# Patient Record
Sex: FEMALE | Race: WHITE | HISPANIC OR LATINO | Employment: UNEMPLOYED
[De-identification: names, ages, dates, MRNs, and addresses within clinical notes are randomized per-mention and may not be internally consistent; named-entity substitution may affect disease eponyms.]

---

## 2018-03-20 ENCOUNTER — HOSPITAL (OUTPATIENT)
Dept: OTHER | Age: 33
End: 2018-03-20
Attending: EMERGENCY MEDICINE

## 2018-03-20 LAB
ALBUMIN SERPL-MCNC: 4 GM/DL (ref 3.6–5.1)
ALBUMIN/GLOB SERPL: 1 {RATIO} (ref 1–2.4)
ALP SERPL-CCNC: 66 UNIT/L (ref 45–117)
ALT SERPL-CCNC: 47 UNIT/L
ANALYZER ANC (IANC): ABNORMAL
ANION GAP SERPL CALC-SCNC: 18 MMOL/L (ref 10–20)
AST SERPL-CCNC: 22 UNIT/L
BASOPHILS # BLD: 0.1 THOUSAND/MCL (ref 0–0.3)
BASOPHILS NFR BLD: 0 %
BILIRUB SERPL-MCNC: 0.3 MG/DL (ref 0.2–1)
BUN SERPL-MCNC: 9 MG/DL (ref 6–20)
BUN/CREAT SERPL: 19 (ref 7–25)
CALCIUM SERPL-MCNC: 9.3 MG/DL (ref 8.4–10.2)
CHLORIDE: 106 MMOL/L (ref 98–107)
CO2 SERPL-SCNC: 23 MMOL/L (ref 21–32)
CREAT SERPL-MCNC: 0.48 MG/DL (ref 0.51–0.95)
DIFFERENTIAL METHOD BLD: ABNORMAL
EOSINOPHIL # BLD: 0.2 THOUSAND/MCL (ref 0.1–0.5)
EOSINOPHIL NFR BLD: 1 %
ERYTHROCYTE [DISTWIDTH] IN BLOOD: 13 % (ref 11–15)
GLOBULIN SER-MCNC: 4.2 GM/DL (ref 2–4)
GLUCOSE SERPL-MCNC: 82 MG/DL (ref 65–99)
HEMATOCRIT: 43.1 % (ref 36–46.5)
HGB BLD-MCNC: 14.6 GM/DL (ref 12–15.5)
LYMPHOCYTES # BLD: 3.5 THOUSAND/MCL (ref 1–4.8)
LYMPHOCYTES NFR BLD: 26 %
MCH RBC QN AUTO: 28.7 PG (ref 26–34)
MCHC RBC AUTO-ENTMCNC: 33.9 GM/DL (ref 32–36.5)
MCV RBC AUTO: 84.7 FL (ref 78–100)
MONOCYTES # BLD: 0.9 THOUSAND/MCL (ref 0.3–0.9)
MONOCYTES NFR BLD: 6 %
NEUTROPHILS # BLD: 8.8 THOUSAND/MCL (ref 1.8–7.7)
NEUTROPHILS NFR BLD: 67 %
NEUTS SEG NFR BLD: ABNORMAL %
PERCENT NRBC: ABNORMAL
PLATELET # BLD: 425 THOUSAND/MCL (ref 140–450)
POTASSIUM SERPL-SCNC: 3.8 MMOL/L (ref 3.4–5.1)
PROT SERPL-MCNC: 8.2 GM/DL (ref 6.4–8.2)
RBC # BLD: 5.09 MILLION/MCL (ref 4–5.2)
SODIUM SERPL-SCNC: 143 MMOL/L (ref 135–145)
TROPONIN I SERPL HS-MCNC: <0.02 NG/ML
WBC # BLD: 13.3 THOUSAND/MCL (ref 4.2–11)

## 2021-06-02 ENCOUNTER — OFFICE VISIT (OUTPATIENT)
Dept: SURGERY | Facility: CLINIC | Age: 36
End: 2021-06-02
Payer: COMMERCIAL

## 2021-06-02 VITALS
HEIGHT: 61.2 IN | HEART RATE: 98 BPM | SYSTOLIC BLOOD PRESSURE: 128 MMHG | DIASTOLIC BLOOD PRESSURE: 81 MMHG | BODY MASS INDEX: 43.23 KG/M2 | OXYGEN SATURATION: 99 % | WEIGHT: 229 LBS

## 2021-06-02 DIAGNOSIS — R63.5 WEIGHT GAIN: ICD-10-CM

## 2021-06-02 DIAGNOSIS — E55.9 VITAMIN D DEFICIENCY: ICD-10-CM

## 2021-06-02 DIAGNOSIS — E88.81 INSULIN RESISTANCE: Primary | ICD-10-CM

## 2021-06-02 DIAGNOSIS — E66.01 MORBID OBESITY WITH BMI OF 40.0-44.9, ADULT (HCC): ICD-10-CM

## 2021-06-02 DIAGNOSIS — R73.09 ABNORMAL BLOOD SUGAR: ICD-10-CM

## 2021-06-02 PROBLEM — E88.819 INSULIN RESISTANCE: Status: ACTIVE | Noted: 2021-06-02

## 2021-06-02 PROCEDURE — 99204 OFFICE O/P NEW MOD 45 MIN: CPT | Performed by: INTERNAL MEDICINE

## 2021-06-02 PROCEDURE — 3074F SYST BP LT 130 MM HG: CPT | Performed by: INTERNAL MEDICINE

## 2021-06-02 PROCEDURE — 3079F DIAST BP 80-89 MM HG: CPT | Performed by: INTERNAL MEDICINE

## 2021-06-02 PROCEDURE — 3008F BODY MASS INDEX DOCD: CPT | Performed by: INTERNAL MEDICINE

## 2021-06-02 RX ORDER — PHENTERMINE HYDROCHLORIDE 15 MG/1
15 CAPSULE ORAL EVERY MORNING
Qty: 30 CAPSULE | Refills: 2 | Status: SHIPPED | OUTPATIENT
Start: 2021-06-02 | End: 2021-08-05

## 2021-06-02 NOTE — PROGRESS NOTES
[facility] Consultation Note       Patient:  Radha Powell  :      1985  MRN:      [id number]    Referring Provider: Dr. Izquierdo ref.  provider found       Chief Complaint:  Patient presents with:  Weight Management: non-surgical weight   Difficulty of Paying Living Expenses:   Food Insecurity:       Worried About [address] in the Last Year:       Ran Out of Food in the Last Year:   Transportation Needs:       Lack of Transportation (Medical):       Lack of Transportation (Non-M chest pain  Gastrointestinal: positive for reflux symptoms  Musculoskeletal:positive for arthralgias and back pain  Neurological: negative  Behavioral/Psych: positive for stress  Endocrine: negative  All other systems were reviewed and are negative.     Mindy exercise program. The patient will check her heart rate and blood pressure on a regular basis. She will call me if her BP goes over 140/90 or if she has palpitations or racing heart rate.  She understands that I will not call in the prescription for her; sh

## 2021-06-26 ENCOUNTER — LAB ENCOUNTER (OUTPATIENT)
Dept: LAB | Facility: HOSPITAL | Age: 36
End: 2021-06-26
Attending: INTERNAL MEDICINE
Payer: COMMERCIAL

## 2021-06-26 DIAGNOSIS — E66.01 MORBID OBESITY WITH BMI OF 40.0-44.9, ADULT (HCC): ICD-10-CM

## 2021-06-26 DIAGNOSIS — E55.9 VITAMIN D DEFICIENCY: ICD-10-CM

## 2021-06-26 DIAGNOSIS — R63.5 WEIGHT GAIN: ICD-10-CM

## 2021-06-26 DIAGNOSIS — E88.81 INSULIN RESISTANCE: ICD-10-CM

## 2021-06-26 DIAGNOSIS — R73.09 ABNORMAL BLOOD SUGAR: ICD-10-CM

## 2021-06-26 PROCEDURE — 93010 ELECTROCARDIOGRAM REPORT: CPT | Performed by: INTERNAL MEDICINE

## 2021-06-26 PROCEDURE — 93005 ELECTROCARDIOGRAM TRACING: CPT

## 2021-06-26 PROCEDURE — 36415 COLL VENOUS BLD VENIPUNCTURE: CPT

## 2021-06-26 PROCEDURE — 85025 COMPLETE CBC W/AUTO DIFF WBC: CPT

## 2021-06-26 PROCEDURE — 84443 ASSAY THYROID STIM HORMONE: CPT

## 2021-06-26 PROCEDURE — 82397 CHEMILUMINESCENT ASSAY: CPT

## 2021-06-26 PROCEDURE — 80061 LIPID PANEL: CPT

## 2021-06-26 PROCEDURE — 82607 VITAMIN B-12: CPT

## 2021-06-26 PROCEDURE — 80053 COMPREHEN METABOLIC PANEL: CPT

## 2021-06-26 PROCEDURE — 83036 HEMOGLOBIN GLYCOSYLATED A1C: CPT

## 2021-07-01 LAB — LEPTIN: 37.3 NG/ML

## 2021-07-28 ENCOUNTER — OFFICE VISIT (OUTPATIENT)
Dept: SURGERY | Facility: CLINIC | Age: 36
End: 2021-07-28
Payer: COMMERCIAL

## 2021-07-28 VITALS — BODY MASS INDEX: 43 KG/M2 | WEIGHT: 227.5 LBS

## 2021-07-28 DIAGNOSIS — R73.09 ABNORMAL BLOOD SUGAR: ICD-10-CM

## 2021-07-28 DIAGNOSIS — E66.01 MORBID OBESITY WITH BMI OF 40.0-44.9, ADULT (HCC): ICD-10-CM

## 2021-07-28 DIAGNOSIS — E88.81 INSULIN RESISTANCE: ICD-10-CM

## 2021-07-28 DIAGNOSIS — R63.5 WEIGHT GAIN: Primary | ICD-10-CM

## 2021-07-28 DIAGNOSIS — E66.01 MORBID OBESITY WITH BMI OF 40.0-44.9, ADULT (HCC): Primary | ICD-10-CM

## 2021-07-28 DIAGNOSIS — E55.9 VITAMIN D DEFICIENCY: ICD-10-CM

## 2021-07-28 PROCEDURE — 97802 MEDICAL NUTRITION INDIV IN: CPT

## 2021-07-28 NOTE — PROGRESS NOTES
[address] [place] [address] AND WEIGHT LOSS CLINIC  [address] [place] [address]  Dept: [phone]  Loc: [phone]    07/28/21      Bariatric Initial Nutrition Session    Ar Nixon is a 39year old female. for: VITB1  No results found for: FOLIC     Meds:     Current Outpatient Medications:   •  Phentermine HCl 15 MG Oral Cap, Take 1 capsule (15 mg total) by mouth every morning., Disp: 30 capsule, Rfl: 2    Height:  Ht Readings from Last 1 Encounters:  06/02 or try a protein supplement. Pt disappointed by only 2 lb weight loss, provided encouragement and motivation. Nutrition Diagnosis     Nutrition Diagnosis: Morbid obesity: Unresolved     Intervention     Recommendations/goals:    1.  Log food intake usin

## 2021-08-05 ENCOUNTER — OFFICE VISIT (OUTPATIENT)
Dept: SURGERY | Facility: CLINIC | Age: 36
End: 2021-08-05
Payer: COMMERCIAL

## 2021-08-05 VITALS
DIASTOLIC BLOOD PRESSURE: 66 MMHG | BODY MASS INDEX: 42.71 KG/M2 | SYSTOLIC BLOOD PRESSURE: 110 MMHG | HEART RATE: 85 BPM | OXYGEN SATURATION: 99 % | WEIGHT: 226.19 LBS | HEIGHT: 61.2 IN

## 2021-08-05 DIAGNOSIS — R63.5 WEIGHT GAIN: ICD-10-CM

## 2021-08-05 DIAGNOSIS — E88.81 INSULIN RESISTANCE: ICD-10-CM

## 2021-08-05 DIAGNOSIS — E66.01 MORBID OBESITY WITH BMI OF 40.0-44.9, ADULT (HCC): ICD-10-CM

## 2021-08-05 DIAGNOSIS — Z51.81 ENCOUNTER FOR THERAPEUTIC DRUG MONITORING: Primary | ICD-10-CM

## 2021-08-05 DIAGNOSIS — E55.9 VITAMIN D DEFICIENCY: ICD-10-CM

## 2021-08-05 PROCEDURE — 3008F BODY MASS INDEX DOCD: CPT | Performed by: NURSE PRACTITIONER

## 2021-08-05 PROCEDURE — 99214 OFFICE O/P EST MOD 30 MIN: CPT | Performed by: NURSE PRACTITIONER

## 2021-08-05 PROCEDURE — 3078F DIAST BP <80 MM HG: CPT | Performed by: NURSE PRACTITIONER

## 2021-08-05 PROCEDURE — 3074F SYST BP LT 130 MM HG: CPT | Performed by: NURSE PRACTITIONER

## 2021-08-05 RX ORDER — PHENTERMINE HYDROCHLORIDE 15 MG/1
15 CAPSULE ORAL EVERY MORNING
Qty: 30 CAPSULE | Refills: 2 | Status: SHIPPED | OUTPATIENT
Start: 2021-08-05 | End: 2021-09-30

## 2021-08-05 NOTE — PROGRESS NOTES
[address] [facility]  [address] [facility] [address]  Dept: [phone]       Patient:  Eleni Howell  :      1985  MRN:      [id number]    Chief Complaint:  Patient Allergies:  Patient has no known allergies.      Social History:  Social History    Socioeconomic History      Marital status:       Spouse name: Not on file      Number of children: Not on file      Years of education: Not on file      Highest e of the week    Eating Habits  · Patient states the following:  · Eats 3 meal(s) per day  · Length of time it takes to consume a meal:    · # of snacks per day:  Type of snacks:  fruit  · Amount of soda consumption per day:  None   · Amount of water (in [employer] resistance  Weight gain  Encounter for therapeutic drug monitoring  (primary encounter diagnosis)    PLAN         Diagnoses and all orders for this visit:    Encounter for therapeutic drug monitoring    Morbid obesity with BMI of 40.0-44.9, adult ([facility] [address]  - RD.    Meet with surgeon as planned.      Labs done 6/26/2021.     Has 's support.      May benefit from pannus removal.     Brandi Freeman, APRN

## 2021-08-11 ENCOUNTER — DOCUMENTATION ONLY (OUTPATIENT)
Dept: SURGERY | Facility: CLINIC | Age: 36
End: 2021-08-11

## 2021-08-11 ENCOUNTER — OFFICE VISIT (OUTPATIENT)
Dept: SURGERY | Facility: CLINIC | Age: 36
End: 2021-08-11
Payer: COMMERCIAL

## 2021-08-11 VITALS — BODY MASS INDEX: 41.59 KG/M2 | OXYGEN SATURATION: 98 % | WEIGHT: 226 LBS | HEART RATE: 75 BPM | HEIGHT: 61.9 IN

## 2021-08-11 DIAGNOSIS — E55.9 VITAMIN D DEFICIENCY: Primary | ICD-10-CM

## 2021-08-11 DIAGNOSIS — R63.5 WEIGHT GAIN: ICD-10-CM

## 2021-08-11 DIAGNOSIS — E66.01 MORBID OBESITY WITH BMI OF 40.0-44.9, ADULT (HCC): ICD-10-CM

## 2021-08-11 DIAGNOSIS — Z51.81 ENCOUNTER FOR THERAPEUTIC DRUG MONITORING: Primary | ICD-10-CM

## 2021-08-11 DIAGNOSIS — E88.81 INSULIN RESISTANCE: ICD-10-CM

## 2021-08-11 DIAGNOSIS — E55.9 VITAMIN D DEFICIENCY: ICD-10-CM

## 2021-08-11 NOTE — H&P
[address] [address]  Dept: [phone]    8/11/2021  Bariatric Surgery Patient Evaluation    Chief Complaint:  morbid obesity     History of Present Rfl: 2     Allergies:  No Known Allergies    ROS:      Constitutional: negative  HEENT: negative  Respiratory: negative  Cardiovascular: negative  Gastrointestinal: negative  Genitourinary: negative  Musculoskeletal: negative  Neurological: negative  Psych

## 2021-08-11 NOTE — PROGRESS NOTES
Oriented pt to the bariatric program; provided/reviewed bariatric packet of info, time line, referrals, etc; pt agreed and verbalized understanding; attended seminar on 7/12/21.

## 2021-08-18 ENCOUNTER — OFFICE VISIT (OUTPATIENT)
Dept: CARDIOLOGY CLINIC | Facility: CLINIC | Age: 36
End: 2021-08-18
Payer: COMMERCIAL

## 2021-08-18 VITALS
HEIGHT: 61.9 IN | DIASTOLIC BLOOD PRESSURE: 79 MMHG | BODY MASS INDEX: 41.77 KG/M2 | SYSTOLIC BLOOD PRESSURE: 117 MMHG | WEIGHT: 227 LBS | HEART RATE: 84 BPM

## 2021-08-18 DIAGNOSIS — R94.31 ABNORMAL EKG: ICD-10-CM

## 2021-08-18 DIAGNOSIS — Z71.9 ENCOUNTER FOR CONSULTATION: Primary | ICD-10-CM

## 2021-08-18 PROCEDURE — 3078F DIAST BP <80 MM HG: CPT | Performed by: NURSE PRACTITIONER

## 2021-08-18 PROCEDURE — 93000 ELECTROCARDIOGRAM COMPLETE: CPT | Performed by: NURSE PRACTITIONER

## 2021-08-18 PROCEDURE — 3074F SYST BP LT 130 MM HG: CPT | Performed by: NURSE PRACTITIONER

## 2021-08-18 PROCEDURE — 3008F BODY MASS INDEX DOCD: CPT | Performed by: NURSE PRACTITIONER

## 2021-08-18 PROCEDURE — 99203 OFFICE O/P NEW LOW 30 MIN: CPT | Performed by: NURSE PRACTITIONER

## 2021-08-18 NOTE — PROGRESS NOTES
Fabian Welsh is a 39year old female. Patient presents with:  Consult  Surgical/procedure Clearance: Bariatric    HPI:   Patient comes in today for consultation. She sees Dr. Patrick Silva in bariatrics and is wanting to pursue surgery.   He is not having a tenderness  EXTREMITIES: no cyanosis, clubbing or edema    ASSESSMENT AND PLAN:     Problem List Items Addressed This Visit     None      Visit Diagnoses     Encounter for consultation    -  Primary    Relevant Orders    ELECTROCARDIOGRAM, COMPLETE    Abno

## 2021-08-27 ENCOUNTER — OFFICE VISIT (OUTPATIENT)
Dept: SURGERY | Facility: CLINIC | Age: 36
End: 2021-08-27
Payer: COMMERCIAL

## 2021-08-27 DIAGNOSIS — E88.81 INSULIN RESISTANCE: ICD-10-CM

## 2021-08-27 DIAGNOSIS — R63.5 WEIGHT GAIN: ICD-10-CM

## 2021-08-27 DIAGNOSIS — E55.9 VITAMIN D DEFICIENCY: ICD-10-CM

## 2021-08-27 DIAGNOSIS — E66.01 MORBID OBESITY WITH BMI OF 40.0-44.9, ADULT (HCC): ICD-10-CM

## 2021-08-27 DIAGNOSIS — R73.09 ABNORMAL BLOOD SUGAR: ICD-10-CM

## 2021-08-27 PROCEDURE — 97803 MED NUTRITION INDIV SUBSEQ: CPT

## 2021-08-27 PROCEDURE — 0358T BIA WHOLE BODY: CPT

## 2021-08-27 PROCEDURE — 3008F BODY MASS INDEX DOCD: CPT

## 2021-08-27 NOTE — PROGRESS NOTES
[facility] AND WEIGHT LOSS CLINIC  [address] [place] [address]  Dept: [phone]  Loc: [phone]    08/27/21      Bariatric Follow-up Nutrition Session    Tanya Schmid is a 39year old female. HCl 15 MG Oral Cap, Take 1 capsule (15 mg total) by mouth every morning., Disp: 30 capsule, Rfl: 2    Height:  Ht Readings from Last 1 Encounters:  08/18/21 : 5' 1.9\" (1.572 m)    Weight:  Wt Readings from Last 6 Encounters:  08/18/21 : 227 lb (103 kg)  0 Nutrition Diagnosis: Morbid obesity: Unresolved     Intervention     Recommendations/goals:    1. Continue to track po intake  2. Read through the surgery information, come back to next appt with any questions you have.    Work on some of the goals out

## 2021-08-27 NOTE — PATIENT INSTRUCTIONS
1. Continuar con el seguimiento de la ingesta de alimentos  2. Pily la información de la cirugía, regrese a la próxima gilles con cualquier pregunta que tenga.   Trabajar en algunos de los objetivos establecidos en los folletos, reducir la ingesta de azúcar, b

## 2021-09-15 ENCOUNTER — OFFICE VISIT (OUTPATIENT)
Dept: SURGERY | Facility: CLINIC | Age: 36
End: 2021-09-15
Payer: COMMERCIAL

## 2021-09-15 VITALS
BODY MASS INDEX: 41.96 KG/M2 | OXYGEN SATURATION: 98 % | WEIGHT: 228 LBS | SYSTOLIC BLOOD PRESSURE: 110 MMHG | DIASTOLIC BLOOD PRESSURE: 70 MMHG | HEART RATE: 80 BPM | HEIGHT: 61.9 IN

## 2021-09-15 DIAGNOSIS — E66.01 MORBID OBESITY WITH BMI OF 40.0-44.9, ADULT (HCC): Primary | ICD-10-CM

## 2021-09-15 DIAGNOSIS — E88.81 INSULIN RESISTANCE: ICD-10-CM

## 2021-09-15 NOTE — PROGRESS NOTES
[address], [address]  [facility]  Dept: [phone]    9/15/2021   Bariatric Surgery Patient Evaluation    Chief Complaint:  morbid obesity     History of [place] kg/m².   General: no acute distress, well-nourished  HENT: normocephalic, atraumatic  Lung: breathing comfortably, symmetrical expansion, no wheezing  Heart: regular rate and rhythm  Abdomen: soft, obese, non-tender, non-distended, no hernia/mass/organomega

## 2021-09-18 ENCOUNTER — LAB ENCOUNTER (OUTPATIENT)
Dept: LAB | Facility: HOSPITAL | Age: 36
End: 2021-09-18
Attending: NURSE PRACTITIONER
Payer: COMMERCIAL

## 2021-09-18 ENCOUNTER — APPOINTMENT (OUTPATIENT)
Dept: CV DIAGNOSTICS | Facility: HOSPITAL | Age: 36
End: 2021-09-18
Attending: NURSE PRACTITIONER
Payer: COMMERCIAL

## 2021-09-18 DIAGNOSIS — E66.01 MORBID OBESITY WITH BMI OF 40.0-44.9, ADULT (HCC): ICD-10-CM

## 2021-09-18 DIAGNOSIS — E55.9 VITAMIN D DEFICIENCY: ICD-10-CM

## 2021-09-18 DIAGNOSIS — E88.81 INSULIN RESISTANCE: ICD-10-CM

## 2021-09-18 LAB
DEPRECATED HBV CORE AB SER IA-ACNC: 114.1 NG/ML
FOLATE SERPL-MCNC: 15.9 NG/ML (ref 8.7–?)
IRON SATURATION: 21 %
IRON SERPL-MCNC: 58 UG/DL
TOTAL IRON BINDING CAPACITY: 274 UG/DL (ref 240–450)
TRANSFERRIN SERPL-MCNC: 184 MG/DL (ref 200–360)
VIT D+METAB SERPL-MCNC: 26.7 NG/ML (ref 30–100)

## 2021-09-18 PROCEDURE — 84425 ASSAY OF VITAMIN B-1: CPT

## 2021-09-18 PROCEDURE — 82728 ASSAY OF FERRITIN: CPT

## 2021-09-18 PROCEDURE — 82306 VITAMIN D 25 HYDROXY: CPT

## 2021-09-18 PROCEDURE — 36415 COLL VENOUS BLD VENIPUNCTURE: CPT

## 2021-09-18 PROCEDURE — 83540 ASSAY OF IRON: CPT

## 2021-09-18 PROCEDURE — 82746 ASSAY OF FOLIC ACID SERUM: CPT

## 2021-09-18 PROCEDURE — 84466 ASSAY OF TRANSFERRIN: CPT

## 2021-09-20 ENCOUNTER — HOSPITAL ENCOUNTER (OUTPATIENT)
Dept: CV DIAGNOSTICS | Facility: HOSPITAL | Age: 36
Discharge: HOME | End: 2021-09-20
Attending: NURSE PRACTITIONER
Payer: COMMERCIAL

## 2021-09-20 DIAGNOSIS — R94.31 ABNORMAL EKG: ICD-10-CM

## 2021-09-20 PROCEDURE — 93306 TTE W/DOPPLER COMPLETE: CPT | Performed by: NURSE PRACTITIONER

## 2021-09-21 ENCOUNTER — OFFICE VISIT (OUTPATIENT)
Dept: PULMONOLOGY | Facility: CLINIC | Age: 36
End: 2021-09-21
Payer: COMMERCIAL

## 2021-09-21 VITALS
DIASTOLIC BLOOD PRESSURE: 76 MMHG | HEIGHT: 62 IN | HEART RATE: 77 BPM | OXYGEN SATURATION: 97 % | BODY MASS INDEX: 42.36 KG/M2 | RESPIRATION RATE: 18 BRPM | WEIGHT: 230.19 LBS | SYSTOLIC BLOOD PRESSURE: 124 MMHG

## 2021-09-21 DIAGNOSIS — Z01.811 ENCOUNTER FOR PREOPERATIVE PULMONARY EXAMINATION: Primary | ICD-10-CM

## 2021-09-21 DIAGNOSIS — R06.83 SNORING: ICD-10-CM

## 2021-09-21 DIAGNOSIS — E66.01 CLASS 3 SEVERE OBESITY DUE TO EXCESS CALORIES WITHOUT SERIOUS COMORBIDITY WITH BODY MASS INDEX (BMI) OF 40.0 TO 44.9 IN ADULT (HCC): ICD-10-CM

## 2021-09-21 DIAGNOSIS — G47.10 HYPERSOMNIA: ICD-10-CM

## 2021-09-21 PROCEDURE — 99244 OFF/OP CNSLTJ NEW/EST MOD 40: CPT | Performed by: PHYSICIAN ASSISTANT

## 2021-09-21 PROCEDURE — 3074F SYST BP LT 130 MM HG: CPT | Performed by: PHYSICIAN ASSISTANT

## 2021-09-21 PROCEDURE — 3008F BODY MASS INDEX DOCD: CPT | Performed by: PHYSICIAN ASSISTANT

## 2021-09-21 PROCEDURE — 3078F DIAST BP <80 MM HG: CPT | Performed by: PHYSICIAN ASSISTANT

## 2021-09-21 NOTE — PROGRESS NOTES
Pulmonary Consult Note    History of Present Illness:  Johanna Simmons is a 39year old female presenting to pulmonary clinic today for preoperative pulmonary evaluation for gastric bypass with Dr. Bethel Boudreaux.  The [employer] medical translation service was S2. No murmurs. Respiratory: Thorax symmetrical with no labored breathing. Clear to ausculation bilaterally with symmetrical breath sounds. No wheezing, rhonchi, rales, or crackles. GI: NABS. Abd soft, non-tender. Extremities: No clubbing or cyanosis.

## 2021-09-23 ENCOUNTER — DOCUMENTATION ONLY (OUTPATIENT)
Dept: SURGERY | Facility: CLINIC | Age: 36
End: 2021-09-23

## 2021-09-23 LAB — VITAMIN B1 (THIAMINE), WHOLE B: 161 NMOL/L

## 2021-09-23 NOTE — PROGRESS NOTES
Please review the information regarding my recent evaluation of our mutual patient, Jori Mims, 6/27/1985.       Surgical Clearance from [facility]  Evaluation Completed on: 09/23/21  Clearance information for: Bariatric  Referri

## 2021-09-27 ENCOUNTER — OFFICE VISIT (OUTPATIENT)
Dept: SURGERY | Facility: CLINIC | Age: 36
End: 2021-09-27
Payer: COMMERCIAL

## 2021-09-27 ENCOUNTER — OFFICE VISIT (OUTPATIENT)
Dept: SLEEP CENTER | Age: 36
End: 2021-09-27
Attending: PHYSICIAN ASSISTANT
Payer: COMMERCIAL

## 2021-09-27 DIAGNOSIS — E88.81 INSULIN RESISTANCE: ICD-10-CM

## 2021-09-27 DIAGNOSIS — G47.33 OSA (OBSTRUCTIVE SLEEP APNEA): Primary | ICD-10-CM

## 2021-09-27 DIAGNOSIS — E66.01 MORBID OBESITY WITH BMI OF 40.0-44.9, ADULT (HCC): Primary | ICD-10-CM

## 2021-09-27 PROCEDURE — 95806 SLEEP STUDY UNATT&RESP EFFT: CPT

## 2021-09-27 PROCEDURE — 97803 MED NUTRITION INDIV SUBSEQ: CPT

## 2021-09-27 NOTE — PROGRESS NOTES
[address] [place] [address] AND WEIGHT LOSS CLINIC  [address] [place] [address]  Dept: [phone]  Loc: [phone]    09/27/21      Bariatric Follow-up Nutrition Session    Angela Whitmore is a 39year old female. found for: VITB1  Lab Results   Component Value Date/Time    FOLIC 67.7 06/02/2829 59:15 AM        Meds:     Current Outpatient Medications:   •  Phentermine HCl 15 MG Oral Cap, Take 1 capsule (15 mg total) by mouth every morning., Disp: 30 capsule, Rfl: 2 Diagnosis: Morbid obesity: Unresolved     Intervention     Recommendations/goals:    1. Continue to track po intake  2. Read through the surgery information, come back to next appt with any questions you have.    Work on some of the goals out laid in the ha

## 2021-09-30 ENCOUNTER — OFFICE VISIT (OUTPATIENT)
Dept: SURGERY | Facility: CLINIC | Age: 36
End: 2021-09-30
Payer: COMMERCIAL

## 2021-09-30 VITALS
HEART RATE: 84 BPM | BODY MASS INDEX: 42 KG/M2 | SYSTOLIC BLOOD PRESSURE: 122 MMHG | DIASTOLIC BLOOD PRESSURE: 84 MMHG | WEIGHT: 230.31 LBS

## 2021-09-30 DIAGNOSIS — R73.09 ABNORMAL BLOOD SUGAR: ICD-10-CM

## 2021-09-30 DIAGNOSIS — E55.9 VITAMIN D DEFICIENCY: ICD-10-CM

## 2021-09-30 DIAGNOSIS — E88.81 INSULIN RESISTANCE: ICD-10-CM

## 2021-09-30 DIAGNOSIS — G47.33 OSA (OBSTRUCTIVE SLEEP APNEA): Primary | ICD-10-CM

## 2021-09-30 DIAGNOSIS — E66.01 MORBID OBESITY WITH BMI OF 40.0-44.9, ADULT (HCC): ICD-10-CM

## 2021-09-30 DIAGNOSIS — R63.5 WEIGHT GAIN: ICD-10-CM

## 2021-09-30 DIAGNOSIS — Z51.81 ENCOUNTER FOR THERAPEUTIC DRUG MONITORING: Primary | ICD-10-CM

## 2021-09-30 PROCEDURE — 3079F DIAST BP 80-89 MM HG: CPT | Performed by: NURSE PRACTITIONER

## 2021-09-30 PROCEDURE — 99214 OFFICE O/P EST MOD 30 MIN: CPT | Performed by: NURSE PRACTITIONER

## 2021-09-30 PROCEDURE — 3074F SYST BP LT 130 MM HG: CPT | Performed by: NURSE PRACTITIONER

## 2021-09-30 RX ORDER — PHENTERMINE HYDROCHLORIDE 15 MG/1
15 CAPSULE ORAL EVERY MORNING
Qty: 30 CAPSULE | Refills: 2 | Status: SHIPPED | OUTPATIENT
Start: 2021-09-30 | End: 2021-09-30

## 2021-09-30 NOTE — PROGRESS NOTES
[address]  Dept: [phone]       Patient:  Severino Nj  :      1985  MRN:      [id number]    Chief Complaint:  Weight M Socioeconomic History      Marital status:       Spouse name: Not on file      Number of children: Not on file      Years of education: Not on file      Highest education level: Not on file    Occupational History      Not on file    Tobacco Use High Blood Pressure Mother    • Heart Attack Father [facility]  · Reviewed and Discussed:       · Patient has a Food Journal?: yes   · Patient is reading nutrition labels?   yes  · Average Caloric Intake:     · Average CHO Intake:   · Is patient normal, no murmur, click, rub or gallop, regular rate and rhythm  Abdomen: soft, obese, non tender.  large pannus  Extremities: extremities normal, atraumatic, no cyanosis or edema  Pulses: 2+ and symmetric  Neurologic: Grossly normal    ASSESSMENT       En follow up as recommended. Met with cardiologist, pulmonologist, and psychologist.    Fina Calderon by psychologist.     S/p echo, cleared. S/p sleep study, pulmonology clearance pending.      Bariatric labs done 6/26/2021.     Has 's support.

## 2021-09-30 NOTE — PROCEDURES
[facility]  Accredited by the [facility] (AASM)    PATIENT'S NAME: ARMAAN Monroy   ATTENDING PHYSICIAN: Tano Davison PA-C   REFERRING PHYSICIAN: Tano Davison PA-C   PATIENT ACCOUNT #: [de-identified] LOC (ICD-10 code G47.33). RECOMMENDATIONS:    1. CPAP titration. 2.   Weight loss. 3.   Avoid alcohol. 4.   Avoid sedating drug. 5.   Patient should not drive if at all sleepy.      Please do not hesitate to contact me if there is any question yen

## 2021-10-05 ENCOUNTER — TELEPHONE (OUTPATIENT)
Dept: PULMONOLOGY | Facility: CLINIC | Age: 36
End: 2021-10-05

## 2021-10-05 DIAGNOSIS — G47.33 OSA (OBSTRUCTIVE SLEEP APNEA): Primary | ICD-10-CM

## 2021-10-05 NOTE — TELEPHONE ENCOUNTER
----- Message from Crispin Mendez PA-C sent at 9/30/2021  3:34 PM CDT -----  RN: Please call the patient to inform her she has moderate SABRINA and I recommend CPAP titration study. Order placed.

## 2021-10-05 NOTE — TELEPHONE ENCOUNTER
Patient checking status on message. Please call - anxious to speak with RN. States she spoke with [facility] and is told that she may need to redo another study.   Patient states Sleep Study is a pre-op requisite before procedure that is scheduled in No

## 2021-10-07 NOTE — TELEPHONE ENCOUNTER
Spoke with patient via Eric Castro #[id number] informed her of Merry Valiente PA-C's message, patient verbalized understanding and will call to schedule CPAP titration study.     Arleth Hahn - Patient inquiring if she has surgical clearance to proceed with ailyn

## 2021-10-07 NOTE — TELEPHONE ENCOUNTER
CPAP order, face sheet, office visit notes and home sleep study faxed to Misael Thornton at [phone]. Fax confirmation received.

## 2021-10-07 NOTE — TELEPHONE ENCOUNTER
[facility] via [facility] [place] #[id number], informed her of Hermann Gardner PA-C's message, patient verbalized understanding and states she would like to start the auto CPAP.       Andrei KEENE - Please place order for auto CPAP, patient anxious to start so she can

## 2021-10-07 NOTE — TELEPHONE ENCOUNTER
She would be at mildly increased risk of postoperative pulmonary compromise with moderately severe untreated SABRINA. It would benefit her to initiate CPAP prior to elective surgery and treatment of SABRINA postoperatively can help with the weight loss.  If date of

## 2021-10-13 ENCOUNTER — OFFICE VISIT (OUTPATIENT)
Dept: SURGERY | Facility: CLINIC | Age: 36
End: 2021-10-13
Payer: COMMERCIAL

## 2021-10-13 VITALS
HEART RATE: 83 BPM | BODY MASS INDEX: 41.96 KG/M2 | DIASTOLIC BLOOD PRESSURE: 80 MMHG | SYSTOLIC BLOOD PRESSURE: 112 MMHG | HEIGHT: 61.9 IN | OXYGEN SATURATION: 98 % | WEIGHT: 228 LBS

## 2021-10-13 DIAGNOSIS — R63.5 WEIGHT GAIN: ICD-10-CM

## 2021-10-13 DIAGNOSIS — E66.01 MORBID OBESITY WITH BMI OF 40.0-44.9, ADULT (HCC): ICD-10-CM

## 2021-10-13 DIAGNOSIS — E55.9 VITAMIN D DEFICIENCY: Primary | ICD-10-CM

## 2021-10-13 RX ORDER — ERGOCALCIFEROL 1.25 MG/1
50000 CAPSULE ORAL WEEKLY
Qty: 4 CAPSULE | Refills: 2 | Status: SHIPPED | OUTPATIENT
Start: 2021-10-13 | End: 2021-11-12

## 2021-10-13 NOTE — PROGRESS NOTES
[address] [facility], [address] [facility]  [address] [facility] [address]  Dept: [phone]    10/13/2021   Bariatric Surgery Patient Evaluation    Chief Complaint:  morbid obesity     History of Prese distress, well-nourished  HENT: normocephalic, atraumatic  Lung: breathing comfortably, symmetrical expansion, no wheezing  Heart: regular rate and rhythm  Abdomen: soft, obese, non-tender, non-distended, no hernia/mass/organomegaly, well-healed Pfann[place]sti willingness to comply with lifestyle and behavioral modifications, ongoing nutrition recommendations, increased physical activity and exercise, participation in support group, and to make and maintain choices that will improve overall health.   We will be s

## 2021-10-14 ENCOUNTER — OFFICE VISIT (OUTPATIENT)
Dept: SURGERY | Facility: CLINIC | Age: 36
End: 2021-10-14
Payer: COMMERCIAL

## 2021-10-14 VITALS — WEIGHT: 226.63 LBS | BODY MASS INDEX: 42.24 KG/M2 | HEIGHT: 61.5 IN

## 2021-10-14 DIAGNOSIS — E66.01 MORBID OBESITY WITH BMI OF 40.0-44.9, ADULT (HCC): Primary | ICD-10-CM

## 2021-10-14 DIAGNOSIS — E88.81 INSULIN RESISTANCE: ICD-10-CM

## 2021-10-14 PROCEDURE — 3008F BODY MASS INDEX DOCD: CPT

## 2021-10-14 PROCEDURE — 97803 MED NUTRITION INDIV SUBSEQ: CPT

## 2021-10-14 NOTE — PROGRESS NOTES
[address] [place] [address] AND WEIGHT LOSS CLINIC  [address] [place] [address]  Dept: [phone]  Loc: [phone]    10/20/21    Bariatric Liquid Protein Nutrition Session    Aurora Hai is a 39year old femal 166 06/26/2021    TRIG 155 (H) 06/26/2021    HDL 26 (L) 06/26/2021     (H) 06/26/2021    VLDL 27 06/26/2021    NONHDLC 140 (H) 06/26/2021       Vitamins/Minerals:  Lab Results   Component Value Date    B12 565 06/26/2021     Lab Results   Component quizzes, Materials given Kyrgyz and Gastic bypass surgery scheduled for 11/29/21. Reviewed ERAS requirements. Provided CHG bath and carb loading handouts. Provided/reviewed handout on pain control after bariatric surgery.       1. Purchase Ensure pre-de la rosa

## 2021-10-14 NOTE — PATIENT INSTRUCTIONS
1. Compre Asegúrese de beber bebida preoperatoria (Ensure pre-surgery drink) x 2, beber italo noche antes de la cirugía y la mañana de la [facility]. 2. Compre jabón CHG, maricarmen la imagen en la página, lo puede encontrar en la farmacia / annetta de comestibles.  Gerda Byers

## 2021-10-22 VITALS — BODY MASS INDEX: 41.79 KG/M2 | WEIGHT: 227.13 LBS | HEIGHT: 61.9 IN

## 2021-11-02 ENCOUNTER — TELEPHONE (OUTPATIENT)
Dept: PULMONOLOGY | Facility: CLINIC | Age: 36
End: 2021-11-02

## 2021-11-02 NOTE — TELEPHONE ENCOUNTER
Светлана/Dr. Blair Printers office requesting surgical clearance letter for bariatric surgery. Светлана requesting to have letter placed into Epic.  Please call once letter has been entered [phone]

## 2021-11-02 NOTE — TELEPHONE ENCOUNTER
Contacted Dr. Taj Guardado office, informed them clearance letter generated in [facility] [address]. Spoke with patient with interpretor line (#[id number]). Informed patient of Hannah Reardon PA-C's message below.  Follow-up appointment scheduled for 12/16/21 at 11AM. Verified date, melinda

## 2021-11-02 NOTE — TELEPHONE ENCOUNTER
Patient is cleared from pulmonary perspective. Letter generated in Epic. Please call the patient to encourage increased CPAP usage.  Please also facilitate f/u at the 1-2 month interval.

## 2021-11-09 ENCOUNTER — TELEPHONE (OUTPATIENT)
Dept: SURGERY | Facility: CLINIC | Age: 36
End: 2021-11-09

## 2021-11-09 NOTE — TELEPHONE ENCOUNTER
Kaylene Taveras,   Patient is wondering if she still needs a med review appt even though she is not taking any medication. Please advise.

## 2021-11-10 NOTE — TELEPHONE ENCOUNTER
Hi,   No patient does not need to do a med review. Per my last note:     Proceed with bariatric surgery after pulmonary clearance and RD clearance. Stop bariatric vitamin 2 weeks prior to surgery. Also stop vitamin D supplement 2 weeks prior to surgery.

## 2021-11-26 ENCOUNTER — LAB ENCOUNTER (OUTPATIENT)
Dept: LAB | Facility: HOSPITAL | Age: 36
End: 2021-11-26
Attending: SINGLE SPECIALTY
Payer: COMMERCIAL

## 2021-11-26 DIAGNOSIS — Z01.818 PREOP TESTING: ICD-10-CM

## 2021-11-26 PROCEDURE — 86901 BLOOD TYPING SEROLOGIC RH(D): CPT

## 2021-11-26 PROCEDURE — 86900 BLOOD TYPING SEROLOGIC ABO: CPT

## 2021-11-26 PROCEDURE — 36415 COLL VENOUS BLD VENIPUNCTURE: CPT

## 2021-11-26 PROCEDURE — 86850 RBC ANTIBODY SCREEN: CPT

## 2021-11-29 ENCOUNTER — ANESTHESIA (OUTPATIENT)
Dept: SURGERY | Facility: HOSPITAL | Age: 36
DRG: 621 | End: 2021-11-29
Payer: COMMERCIAL

## 2021-11-29 ENCOUNTER — ANESTHESIA EVENT (OUTPATIENT)
Dept: SURGERY | Facility: HOSPITAL | Age: 36
DRG: 621 | End: 2021-11-29
Payer: COMMERCIAL

## 2021-11-29 ENCOUNTER — HOSPITAL ENCOUNTER (INPATIENT)
Facility: HOSPITAL | Age: 36
LOS: 1 days | Discharge: HOME | DRG: 621 | End: 2021-11-30
Attending: SINGLE SPECIALTY | Admitting: SINGLE SPECIALTY
Payer: COMMERCIAL

## 2021-11-29 DIAGNOSIS — Z01.818 PREOP TESTING: Primary | ICD-10-CM

## 2021-11-29 PROBLEM — E66.01 MORBID (SEVERE) OBESITY DUE TO EXCESS CALORIES (HCC): Status: ACTIVE | Noted: 2021-11-29

## 2021-11-29 PROBLEM — G47.33 OSA (OBSTRUCTIVE SLEEP APNEA): Chronic | Status: ACTIVE | Noted: 2021-11-29

## 2021-11-29 PROCEDURE — 0D164ZA BYPASS STOMACH TO JEJUNUM, PERCUTANEOUS ENDOSCOPIC APPROACH: ICD-10-PCS | Performed by: SINGLE SPECIALTY

## 2021-11-29 PROCEDURE — 0DJ08ZZ INSPECTION OF UPPER INTESTINAL TRACT, VIA NATURAL OR ARTIFICIAL OPENING ENDOSCOPIC: ICD-10-PCS | Performed by: SINGLE SPECIALTY

## 2021-11-29 PROCEDURE — 3E0T3BZ INTRODUCTION OF ANESTHETIC AGENT INTO PERIPHERAL NERVES AND PLEXI, PERCUTANEOUS APPROACH: ICD-10-PCS | Performed by: SINGLE SPECIALTY

## 2021-11-29 PROCEDURE — 99232 SBSQ HOSP IP/OBS MODERATE 35: CPT | Performed by: HOSPITALIST

## 2021-11-29 RX ORDER — GABAPENTIN 300 MG/1
300 CAPSULE ORAL ONCE
Status: COMPLETED | OUTPATIENT
Start: 2021-11-29 | End: 2021-11-29

## 2021-11-29 RX ORDER — DEXAMETHASONE SODIUM PHOSPHATE 4 MG/ML
VIAL (ML) INJECTION AS NEEDED
Status: DISCONTINUED | OUTPATIENT
Start: 2021-11-29 | End: 2021-11-29 | Stop reason: SURG

## 2021-11-29 RX ORDER — ENOXAPARIN SODIUM 100 MG/ML
40 INJECTION SUBCUTANEOUS DAILY
Status: DISCONTINUED | OUTPATIENT
Start: 2021-11-30 | End: 2021-11-30

## 2021-11-29 RX ORDER — MORPHINE SULFATE 2 MG/ML
2 INJECTION, SOLUTION INTRAMUSCULAR; INTRAVENOUS EVERY 2 HOUR PRN
Status: DISCONTINUED | OUTPATIENT
Start: 2021-11-29 | End: 2021-11-30

## 2021-11-29 RX ORDER — PANTOPRAZOLE SODIUM 40 MG/1
40 TABLET, DELAYED RELEASE ORAL
Status: DISCONTINUED | OUTPATIENT
Start: 2021-11-30 | End: 2021-11-30

## 2021-11-29 RX ORDER — ONDANSETRON 2 MG/ML
4 INJECTION INTRAMUSCULAR; INTRAVENOUS EVERY 6 HOURS PRN
Status: DISCONTINUED | OUTPATIENT
Start: 2021-11-29 | End: 2021-11-30

## 2021-11-29 RX ORDER — CEFAZOLIN SODIUM/WATER 2 G/20 ML
2 SYRINGE (ML) INTRAVENOUS ONCE
Status: COMPLETED | OUTPATIENT
Start: 2021-11-29 | End: 2021-11-29

## 2021-11-29 RX ORDER — PROCHLORPERAZINE EDISYLATE 5 MG/ML
5 INJECTION INTRAMUSCULAR; INTRAVENOUS ONCE AS NEEDED
Status: DISCONTINUED | OUTPATIENT
Start: 2021-11-29 | End: 2021-11-29 | Stop reason: HOSPADM

## 2021-11-29 RX ORDER — METOCLOPRAMIDE 10 MG/1
10 TABLET ORAL ONCE
Status: COMPLETED | OUTPATIENT
Start: 2021-11-29 | End: 2021-11-29

## 2021-11-29 RX ORDER — MORPHINE SULFATE 4 MG/ML
2 INJECTION, SOLUTION INTRAMUSCULAR; INTRAVENOUS EVERY 10 MIN PRN
Status: DISCONTINUED | OUTPATIENT
Start: 2021-11-29 | End: 2021-11-29 | Stop reason: HOSPADM

## 2021-11-29 RX ORDER — HYDROCODONE BITARTRATE AND ACETAMINOPHEN 5; 325 MG/1; MG/1
2 TABLET ORAL AS NEEDED
Status: DISCONTINUED | OUTPATIENT
Start: 2021-11-29 | End: 2021-11-29 | Stop reason: HOSPADM

## 2021-11-29 RX ORDER — MORPHINE SULFATE 10 MG/ML
6 INJECTION, SOLUTION INTRAMUSCULAR; INTRAVENOUS EVERY 10 MIN PRN
Status: DISCONTINUED | OUTPATIENT
Start: 2021-11-29 | End: 2021-11-29 | Stop reason: HOSPADM

## 2021-11-29 RX ORDER — MORPHINE SULFATE 2 MG/ML
1 INJECTION, SOLUTION INTRAMUSCULAR; INTRAVENOUS EVERY 2 HOUR PRN
Status: DISCONTINUED | OUTPATIENT
Start: 2021-11-29 | End: 2021-11-30

## 2021-11-29 RX ORDER — MORPHINE SULFATE 4 MG/ML
4 INJECTION, SOLUTION INTRAMUSCULAR; INTRAVENOUS EVERY 2 HOUR PRN
Status: DISCONTINUED | OUTPATIENT
Start: 2021-11-29 | End: 2021-11-30

## 2021-11-29 RX ORDER — HEPARIN SODIUM 5000 [USP'U]/ML
5000 INJECTION, SOLUTION INTRAVENOUS; SUBCUTANEOUS ONCE
Status: COMPLETED | OUTPATIENT
Start: 2021-11-29 | End: 2021-11-29

## 2021-11-29 RX ORDER — SODIUM CHLORIDE, SODIUM LACTATE, POTASSIUM CHLORIDE, CALCIUM CHLORIDE 600; 310; 30; 20 MG/100ML; MG/100ML; MG/100ML; MG/100ML
INJECTION, SOLUTION INTRAVENOUS CONTINUOUS
Status: DISCONTINUED | OUTPATIENT
Start: 2021-11-29 | End: 2021-11-30

## 2021-11-29 RX ORDER — ONDANSETRON 2 MG/ML
4 INJECTION INTRAMUSCULAR; INTRAVENOUS ONCE AS NEEDED
Status: COMPLETED | OUTPATIENT
Start: 2021-11-29 | End: 2021-11-29

## 2021-11-29 RX ORDER — KETOROLAC TROMETHAMINE 30 MG/ML
30 INJECTION, SOLUTION INTRAMUSCULAR; INTRAVENOUS EVERY 6 HOURS
Status: DISCONTINUED | OUTPATIENT
Start: 2021-11-29 | End: 2021-11-30

## 2021-11-29 RX ORDER — CELECOXIB 200 MG/1
400 CAPSULE ORAL ONCE
Status: COMPLETED | OUTPATIENT
Start: 2021-11-29 | End: 2021-11-29

## 2021-11-29 RX ORDER — HYDROCODONE BITARTRATE AND ACETAMINOPHEN 5; 325 MG/1; MG/1
1 TABLET ORAL AS NEEDED
Status: DISCONTINUED | OUTPATIENT
Start: 2021-11-29 | End: 2021-11-29 | Stop reason: HOSPADM

## 2021-11-29 RX ORDER — LIDOCAINE HYDROCHLORIDE 10 MG/ML
INJECTION, SOLUTION EPIDURAL; INFILTRATION; INTRACAUDAL; PERINEURAL AS NEEDED
Status: DISCONTINUED | OUTPATIENT
Start: 2021-11-29 | End: 2021-11-29 | Stop reason: SURG

## 2021-11-29 RX ORDER — ACETAMINOPHEN 500 MG
1000 TABLET ORAL ONCE
Status: COMPLETED | OUTPATIENT
Start: 2021-11-29 | End: 2021-11-29

## 2021-11-29 RX ORDER — HYDROMORPHONE HYDROCHLORIDE 1 MG/ML
0.2 INJECTION, SOLUTION INTRAMUSCULAR; INTRAVENOUS; SUBCUTANEOUS EVERY 5 MIN PRN
Status: DISCONTINUED | OUTPATIENT
Start: 2021-11-29 | End: 2021-11-29 | Stop reason: HOSPADM

## 2021-11-29 RX ORDER — BUPIVACAINE HYDROCHLORIDE AND EPINEPHRINE 2.5; 5 MG/ML; UG/ML
INJECTION, SOLUTION INFILTRATION; PERINEURAL AS NEEDED
Status: DISCONTINUED | OUTPATIENT
Start: 2021-11-29 | End: 2021-11-29 | Stop reason: HOSPADM

## 2021-11-29 RX ORDER — NALOXONE HYDROCHLORIDE 0.4 MG/ML
80 INJECTION, SOLUTION INTRAMUSCULAR; INTRAVENOUS; SUBCUTANEOUS AS NEEDED
Status: DISCONTINUED | OUTPATIENT
Start: 2021-11-29 | End: 2021-11-29 | Stop reason: HOSPADM

## 2021-11-29 RX ORDER — ROCURONIUM BROMIDE 10 MG/ML
INJECTION, SOLUTION INTRAVENOUS AS NEEDED
Status: DISCONTINUED | OUTPATIENT
Start: 2021-11-29 | End: 2021-11-29 | Stop reason: SURG

## 2021-11-29 RX ORDER — HYDROMORPHONE HYDROCHLORIDE 1 MG/ML
0.6 INJECTION, SOLUTION INTRAMUSCULAR; INTRAVENOUS; SUBCUTANEOUS EVERY 5 MIN PRN
Status: DISCONTINUED | OUTPATIENT
Start: 2021-11-29 | End: 2021-11-29 | Stop reason: HOSPADM

## 2021-11-29 RX ORDER — OXYCODONE HYDROCHLORIDE 5 MG/1
5 TABLET ORAL EVERY 6 HOURS PRN
Status: DISCONTINUED | OUTPATIENT
Start: 2021-11-29 | End: 2021-11-30

## 2021-11-29 RX ORDER — HYDROMORPHONE HYDROCHLORIDE 1 MG/ML
0.4 INJECTION, SOLUTION INTRAMUSCULAR; INTRAVENOUS; SUBCUTANEOUS EVERY 5 MIN PRN
Status: DISCONTINUED | OUTPATIENT
Start: 2021-11-29 | End: 2021-11-29 | Stop reason: HOSPADM

## 2021-11-29 RX ORDER — ONDANSETRON 2 MG/ML
INJECTION INTRAMUSCULAR; INTRAVENOUS AS NEEDED
Status: DISCONTINUED | OUTPATIENT
Start: 2021-11-29 | End: 2021-11-29 | Stop reason: SURG

## 2021-11-29 RX ORDER — HALOPERIDOL 5 MG/ML
0.25 INJECTION INTRAMUSCULAR ONCE AS NEEDED
Status: DISCONTINUED | OUTPATIENT
Start: 2021-11-29 | End: 2021-11-29 | Stop reason: HOSPADM

## 2021-11-29 RX ORDER — ACETAMINOPHEN 160 MG/5ML
1000 SOLUTION ORAL EVERY 8 HOURS
Status: DISCONTINUED | OUTPATIENT
Start: 2021-11-29 | End: 2021-11-30

## 2021-11-29 RX ORDER — SODIUM CHLORIDE, SODIUM LACTATE, POTASSIUM CHLORIDE, CALCIUM CHLORIDE 600; 310; 30; 20 MG/100ML; MG/100ML; MG/100ML; MG/100ML
INJECTION, SOLUTION INTRAVENOUS CONTINUOUS
Status: DISCONTINUED | OUTPATIENT
Start: 2021-11-29 | End: 2021-11-29 | Stop reason: HOSPADM

## 2021-11-29 RX ORDER — MORPHINE SULFATE 4 MG/ML
4 INJECTION, SOLUTION INTRAMUSCULAR; INTRAVENOUS EVERY 10 MIN PRN
Status: DISCONTINUED | OUTPATIENT
Start: 2021-11-29 | End: 2021-11-29 | Stop reason: HOSPADM

## 2021-11-29 RX ORDER — FAMOTIDINE 20 MG/1
20 TABLET ORAL ONCE
Status: COMPLETED | OUTPATIENT
Start: 2021-11-29 | End: 2021-11-29

## 2021-11-29 RX ADMIN — LIDOCAINE HYDROCHLORIDE 25 MG: 10 INJECTION, SOLUTION EPIDURAL; INFILTRATION; INTRACAUDAL; PERINEURAL at 10:41:00

## 2021-11-29 RX ADMIN — DEXAMETHASONE SODIUM PHOSPHATE 4 MG: 4 MG/ML VIAL (ML) INJECTION at 10:51:00

## 2021-11-29 RX ADMIN — ONDANSETRON 4 MG: 2 INJECTION INTRAMUSCULAR; INTRAVENOUS at 12:16:00

## 2021-11-29 RX ADMIN — ROCURONIUM BROMIDE 35 MG: 10 INJECTION, SOLUTION INTRAVENOUS at 10:51:00

## 2021-11-29 RX ADMIN — SODIUM CHLORIDE, SODIUM LACTATE, POTASSIUM CHLORIDE, CALCIUM CHLORIDE: 600; 310; 30; 20 INJECTION, SOLUTION INTRAVENOUS at 10:38:00

## 2021-11-29 RX ADMIN — SODIUM CHLORIDE, SODIUM LACTATE, POTASSIUM CHLORIDE, CALCIUM CHLORIDE: 600; 310; 30; 20 INJECTION, SOLUTION INTRAVENOUS at 12:19:00

## 2021-11-29 RX ADMIN — CEFAZOLIN SODIUM/WATER 2 G: 2 G/20 ML SYRINGE (ML) INTRAVENOUS at 10:39:00

## 2021-11-29 RX ADMIN — ROCURONIUM BROMIDE 5 MG: 10 INJECTION, SOLUTION INTRAVENOUS at 10:46:00

## 2021-11-29 RX ADMIN — LIDOCAINE HYDROCHLORIDE 25 MG: 10 INJECTION, SOLUTION EPIDURAL; INFILTRATION; INTRACAUDAL; PERINEURAL at 10:46:00

## 2021-11-29 RX ADMIN — ROCURONIUM BROMIDE 5 MG: 10 INJECTION, SOLUTION INTRAVENOUS at 11:44:00

## 2021-11-29 NOTE — H&P
Sandy Anthony / Leonid George / Reather Libman / Kyle Jorge / Sofia Paredesr / Za  - [phone]  Answering Service [phone]      Rosalinda Heard Patient Status:  Inpatient    1985 MRN [id number]   Locatio well-nourished  HENT: normocephalic, atraumatic  Lung: breathing comfortably, symmetrical expansion, clear to ausculation bilaterally, no wheezing  Heart: regular rate and rhythm, no murmur detected  Abdomen: soft, obese, non-tender, non-distended  Extremi

## 2021-11-29 NOTE — PLAN OF CARE
Patient is alert and oriented x4, drowsy. Showing no signs or symptoms of any distress. Arrived to the unit around 2:40pm.Fresh post op a gastric bypass. 4 Lap site- dermabond and open to air. Ice packs applied. Ambulating with standby assistance and pole. function  Description: INTERVENTIONS:  - Assess patient stability and activity tolerance for standing, transferring and ambulating w/ or w/o assistive devices  - Assist with transfers and ambulation using safe patient handling equipment as needed  - Ensure increased pain with activity and pre-medicate as appropriate  Outcome: Progressing     Problem: SAFETY ADULT - FALL  Goal: Free from fall injury  Description: INTERVENTIONS:  - Assess pt frequently for physical needs  - Identify cognitive and physical defi

## 2021-11-29 NOTE — ANESTHESIA PREPROCEDURE EVALUATION
Anesthesia PreOp Note    HPI:     Darian Bettencourt is a 39year old female who presents for preoperative consultation requested by:  Donny Caraballo MD    Date of Surgery: 11/29/2021    Procedure(s):  laparoscopic possible open hair-en-y gastric bypass  In History      Not on file    Tobacco Use      Smoking status: Never Smoker      Smokeless tobacco: Never Used    Vaping Use      Vaping Use: Never used    Substance and Sexual Activity      Alcohol use: Never      Drug use: Never      Sexual activity: Not o Discussed With:  Patient  Discussed plan with:  CRNA and surgeon      I have informed Cathryn Garcia and/or legal guardian or family member of the nature of the anesthetic plan, benefits, risks including possible dental damage if relevant, major comp

## 2021-11-29 NOTE — ANESTHESIA POSTPROCEDURE EVALUATION
Patient: Gail King    Procedure Summary     Date: 11/29/21 Room / Location: [address] [place] [address] MAIN OR 03 / [address] [place] [address] OR    Anesthesia Start: 1038 Anesthesia Stop: 5671    Procedure: laparoscopic hair-en-y gastric bypass (N/A ) Diagnosis: (morbid obesity, obstr

## 2021-11-29 NOTE — OPERATIVE REPORT
Tonio Campa / Drew Ohara / Alexandre Green / Lili Borges / Adrianna Ballard / Ilana [facility] [phone]  [facility] [phone]        Date: 11/29/2021  Preop Diagnosis: Morbid Obesity secondary to excess calories   Postop placed in a supine fashion on the table. A general anesthetic and the patient was intubated without incident. Pre-operative antibiotics were given. The abdomen was prepped and draped in the usual sterile fashion and a timeout was performed.     I gained brought up without tension and an enterotomy made to allow access for a 25 EEA stapler. This was brought through the left lateral port site after first placing a wound protector.    The stapler easily went into the hair limb overhang and was docked with th

## 2021-11-29 NOTE — PROGRESS NOTES
[facility]    Progress Note    Jorge Luis Castro Patient Status:  Inpatient    1985 MRN [id number]   Location [address] UNIT Attending Timothy Shen,  [facility] Day # 0 PCP No primary care provider on 06/26/2021    HCT 41.6 06/26/2021    .0 06/26/2021    CREATSERUM 0.56 06/26/2021    BUN 11 06/26/2021     06/26/2021    K 4.1 06/26/2021     06/26/2021    CO2 27.0 06/26/2021     (H) 06/26/2021    CA 8.8 06/26/2021    ALB 3.8 06/2

## 2021-11-29 NOTE — ANESTHESIA PROCEDURE NOTES
Airway  Date/Time: 11/29/2021 10:48 AM  Urgency: Elective      General Information and Staff    Patient location during procedure: OR  Anesthesiologist: Sandro Gaston MD  Resident/CRNA: Sanjana Al CRNA  Performed: CRNA     Indications and Patie

## 2021-11-30 VITALS
OXYGEN SATURATION: 99 % | DIASTOLIC BLOOD PRESSURE: 54 MMHG | HEART RATE: 56 BPM | WEIGHT: 215 LBS | HEIGHT: 62 IN | BODY MASS INDEX: 39.56 KG/M2 | RESPIRATION RATE: 18 BRPM | SYSTOLIC BLOOD PRESSURE: 115 MMHG | TEMPERATURE: 99 F

## 2021-11-30 PROCEDURE — 99239 HOSP IP/OBS DSCHRG MGMT >30: CPT | Performed by: HOSPITALIST

## 2021-11-30 RX ORDER — ACETAMINOPHEN 160 MG/5ML
1000 SOLUTION ORAL EVERY 8 HOURS
Qty: 473 ML | Refills: 2 | Status: SHIPPED | OUTPATIENT
Start: 2021-11-30

## 2021-11-30 RX ORDER — PANTOPRAZOLE SODIUM 40 MG/1
40 TABLET, DELAYED RELEASE ORAL
Qty: 90 TABLET | Refills: 0 | Status: SHIPPED | OUTPATIENT
Start: 2021-12-01

## 2021-11-30 RX ORDER — OXYCODONE HYDROCHLORIDE 5 MG/1
5 TABLET ORAL EVERY 6 HOURS PRN
Qty: 10 TABLET | Refills: 0 | Status: SHIPPED | OUTPATIENT
Start: 2021-11-30

## 2021-11-30 NOTE — PLAN OF CARE
Patient Nigerien speaking- translation line and Nigerien speaking staff used to communicate. Monitoring vital signs- stable at this time. No acute changes noted throughout shift. Receiving IV fluids per MD order.  Patient having severe cramping due to her per frequently  - See additional Care Plan goals for specific interventions  Outcome: Adequate for Discharge  Goal: Patient/Family Short Term Goal  Description: Patient's Short Term Goal: to have less cramping    Interventions:   - Ambulate frequently  - PRN p activity/tolerance for mobility and gait  - Educate and engage patient/family in tolerated activity level and precautions  Outcome: Adequate for Discharge     Problem: Impaired Activities of Daily Living  Goal: Achieve highest/safest level of independence effort  - Oxygen supplementation based on oxygen saturation or ABGs  - Provide Smoking Cessation handout, if applicable  - Encourage broncho-pulmonary hygiene including cough, deep breathe, Incentive Spirometry  - Assess the need for suctioning and perform ADULT  Goal: Verbalizes/displays adequate comfort level or patient's stated pain goal  Description: INTERVENTIONS:  - Encourage pt to monitor pain and request assistance  - Assess pain using appropriate pain scale  - Administer analgesics based on type and Assess patient's ability to be responsible for managing their own health  - Refer to Case Management Department for coordinating discharge planning if the patient needs post-hospital services based on physician/LIP order or complex needs related to functio

## 2021-11-30 NOTE — PLAN OF CARE
Alert and oriented x 3. CMS intact. Tele in place. SCDs/Lovenox to start. Room air. Cpap in use at night. Encouraged incentive spirometry. Tolerated diet. Denies nausea. Flatus/belching present. LBM 11/28/21. Standby assistance.  Voiding well with adequate factors for pressure ulcer development  - Assess and document skin integrity  - Assess and document dressing/incision, wound bed, drain sites and surrounding tissue  - Implement wound care per orders  - Initiate isolation precautions as appropriate  - Init CARDIOVASCULAR - ADULT  Goal: Maintains optimal cardiac output and hemodynamic stability  Description: INTERVENTIONS:  - Monitor vital signs, rhythm, and trends  - Monitor for bleeding, hypotension and signs of decreased cardiac output  - Evaluate effectiv contributing to increased intake, treat as appropriate  - Monitor I&O, WT and lab values  - Obtain nutritional consult as needed  - Evaluate psychosocial factors contributing to over-consumption  Outcome: Progressing     Problem: HEMATOLOGIC - ADULT  Goal: appropriate  Outcome: Progressing     Problem: SAFETY ADULT - FALL  Goal: Free from fall injury  Description: INTERVENTIONS:  - Assess pt frequently for physical needs  - Identify cognitive and physical deficits and behaviors that affect risk of falls.   - time for understanding and response  - Establish method for patient to ask for assistance (call light)  - Provide an  as needed  - Communicate barriers and strategies to overcome with those who interact with patient  Outcome: Progressing

## 2021-11-30 NOTE — PROGRESS NOTES
Bariatric Inpatient Nutrition Note      Agustin Peraza is a 39year old female.     Procedure: Laparoscopic gastric bypass surgery  Surgery Date: 11/29/21  Medical Diagnosis: Morbid obesity    Height:  Ht Readings from Last 1 Encounters:  11/29/21 Oral, Once  [COMPLETED] metoclopramide (REGLAN) tab 10 mg, 10 mg, Oral, Once  [COMPLETED] Heparin Sodium (Porcine) 5000 UNIT/ML injection 5,000 Units, 5,000 Units, Subcutaneous, Once  [COMPLETED] gabapentin (NEURONTIN) cap 300 mg, 300 mg, Oral, Once  [COMP post-op    Monitor/Evaluate: Anthropometric measurements, Food/fluid intake/choices, Food intolerances, Activity level, Vitamin/mineral supplementation, Reinforce goals and Calorie/protein intake    Ariana Foster MS, RD, LDN  Bariatric Dietitian

## 2021-11-30 NOTE — DISCHARGE SUMMARY
[facility]    Discharge Summary    [address] Patient Status:  Inpatient    1985 MRN [id number]   Location [facility] 4W/SW/SE Attending Katharine Carmona MD   Hosp Day # 1 PCP No primary care provider on file.      [facility] Surgical Procedures     Case IDs Date Procedure Surgeon Location Status    [id number] 11/29/21 laparoscopic hair-en-y gastric bypass Shalonda Brito MD [address] [place] [address] MAIN OR [facility]            Discharge Plan:   Discharge Condition: Stable    Current Discharge Medication L disease. During this procedure, a doctor surgically changed your stomach (and maybe small intestine as well). Now that your stomach is smaller in size, it can only hold a small amount of food at one time and affects how you absorb your nutrients.     This s body to absorb the liquid. .    ·         Liquids should be avoided for a period of 15 minutes before and 30 minutes after eating solid food or meals. ·         Avoid drinks that are caffeinated, sugary and/or carbonated. ·         Eat slowly.  Plan o garbage, or carrying groceries until your doctor says it is okay to do so. ·         Climb stairs slowly and pause every few steps. ·         You can benefit from simple activities such as walking or gardening.     ·         Ask your doctor or the Exe following:    ·         Cloudy or smelly drainage from the incision site    ·         Redness, pain, or increased swelling at the incision site    ·         Fever of 100.4 F (38 C) or higher, or shaking chills    ·         Pulse (heart rate) over 110    ·

## 2021-11-30 NOTE — PROGRESS NOTES
Vimal Clemons / Everett Brow Drs. Emmett Fleischer / Jose Carlos Nava / Gigi Méndez / Tracie Bell / Brittaney Chavez - [phone]  Answering Service [phone]      Progress Note    Hany Heard Patient Status:  Inpatient    1985 MRN [id number].    Assessment/Plan:  S/p gastric bypass, doing well. Home today.   Discussed in [address], questions answered    Jenny Parada MD  11/30/2021  7:46 AM

## 2021-12-01 ENCOUNTER — TELEPHONE (OUTPATIENT)
Dept: SURGERY | Facility: CLINIC | Age: 36
End: 2021-12-01

## 2021-12-01 NOTE — TELEPHONE ENCOUNTER
Spoke w/ pt w/ assistance of [employer] translation, pt reports 0 lvl of pain after pain meds while at rest, 4 pain lvl with movement or standing up; denies F/N/V/Tachy; wounds healing nicely; some constipation, instructed pt to take MOM, Miralax or dilute

## 2021-12-07 ENCOUNTER — HOSPITAL ENCOUNTER (EMERGENCY)
Facility: HOSPITAL | Age: 36
Discharge: HOME | End: 2021-12-07
Attending: EMERGENCY MEDICINE
Payer: COMMERCIAL

## 2021-12-07 ENCOUNTER — APPOINTMENT (OUTPATIENT)
Dept: CT IMAGING | Facility: HOSPITAL | Age: 36
End: 2021-12-07
Attending: EMERGENCY MEDICINE
Payer: COMMERCIAL

## 2021-12-07 VITALS
TEMPERATURE: 98 F | RESPIRATION RATE: 18 BRPM | WEIGHT: 215 LBS | SYSTOLIC BLOOD PRESSURE: 136 MMHG | BODY MASS INDEX: 39.56 KG/M2 | OXYGEN SATURATION: 99 % | HEART RATE: 70 BPM | DIASTOLIC BLOOD PRESSURE: 75 MMHG | HEIGHT: 62 IN

## 2021-12-07 DIAGNOSIS — K91.872 POSTPROCEDURAL SEROMA OF A DIGESTIVE SYSTEM ORGAN OR STRUCTURE FOLLOWING A DIGESTIVE SYSTEM PROCEDURE: Primary | ICD-10-CM

## 2021-12-07 PROCEDURE — 96374 THER/PROPH/DIAG INJ IV PUSH: CPT

## 2021-12-07 PROCEDURE — 74177 CT ABD & PELVIS W/CONTRAST: CPT | Performed by: EMERGENCY MEDICINE

## 2021-12-07 PROCEDURE — 85025 COMPLETE CBC W/AUTO DIFF WBC: CPT

## 2021-12-07 PROCEDURE — 80048 BASIC METABOLIC PNL TOTAL CA: CPT | Performed by: EMERGENCY MEDICINE

## 2021-12-07 PROCEDURE — 81025 URINE PREGNANCY TEST: CPT

## 2021-12-07 PROCEDURE — 80048 BASIC METABOLIC PNL TOTAL CA: CPT

## 2021-12-07 PROCEDURE — 85025 COMPLETE CBC W/AUTO DIFF WBC: CPT | Performed by: EMERGENCY MEDICINE

## 2021-12-07 PROCEDURE — 99284 EMERGENCY DEPT VISIT MOD MDM: CPT

## 2021-12-07 RX ORDER — KETOROLAC TROMETHAMINE 15 MG/ML
15 INJECTION, SOLUTION INTRAMUSCULAR; INTRAVENOUS ONCE
Status: COMPLETED | OUTPATIENT
Start: 2021-12-07 | End: 2021-12-07

## 2021-12-07 NOTE — ED PROVIDER NOTES
Patient Seen in: [facility] AND [facility] Emergency Department      History   Patient presents with:   Infection    Stated Complaint: abdominal pain    Subjective:   HPI    39 yoF status post laparoscopic Candelaria-en-Y gastric bypass on November 29 presents for jovita well-developed. HENT:      Head: Normocephalic and atraumatic. Mouth/Throat:      Mouth: Mucous membranes are moist.      Pharynx: Oropharynx is clear. Eyes:      Extraocular Movements: Extraocular movements intact.       Pupils: Pupils are equal, PREGNANCY URINE - Normal   CBC WITH DIFFERENTIAL WITH PLATELET    Narrative: The following orders were created for panel order CBC With Differential With Platelet.   Procedure                               Abnormality         Status recommend starting antibiotics at this point as there is no significant concern for deeper intra-abdominal infection. Patient is comfortable with this plan.                            Disposition and Plan     Clinical Impression:  Postprocedural seroma of a

## 2021-12-07 NOTE — ED INITIAL ASSESSMENT (HPI)
#[id number]. Patient had bariatric surgery 11/29/21. Patient has surgical wound to L side of abdomen that has pus and drainage coming from the wound starting yesterday. Patient now also having pain to L lower back.  Patient denies urinary symptoms,

## 2021-12-08 ENCOUNTER — DOCUMENTATION ONLY (OUTPATIENT)
Dept: SURGERY | Facility: CLINIC | Age: 36
End: 2021-12-08

## 2021-12-08 ENCOUNTER — OFFICE VISIT (OUTPATIENT)
Dept: SURGERY | Facility: CLINIC | Age: 36
End: 2021-12-08
Payer: COMMERCIAL

## 2021-12-08 VITALS
BODY MASS INDEX: 38.12 KG/M2 | SYSTOLIC BLOOD PRESSURE: 105 MMHG | HEART RATE: 77 BPM | WEIGHT: 207.13 LBS | OXYGEN SATURATION: 98 % | HEIGHT: 61.9 IN | DIASTOLIC BLOOD PRESSURE: 69 MMHG

## 2021-12-08 DIAGNOSIS — E66.01 MORBID OBESITY WITH BMI OF 40.0-44.9, ADULT (HCC): ICD-10-CM

## 2021-12-08 DIAGNOSIS — Z98.84 S/P GASTRIC BYPASS: ICD-10-CM

## 2021-12-08 DIAGNOSIS — E66.01 MORBID (SEVERE) OBESITY DUE TO EXCESS CALORIES (HCC): ICD-10-CM

## 2021-12-08 DIAGNOSIS — G47.33 OSA (OBSTRUCTIVE SLEEP APNEA): Primary | Chronic | ICD-10-CM

## 2021-12-08 RX ORDER — AMOXICILLIN AND CLAVULANATE POTASSIUM 875; 125 MG/1; MG/1
1 TABLET, FILM COATED ORAL 2 TIMES DAILY
Qty: 20 TABLET | Refills: 0 | Status: SHIPPED | OUTPATIENT
Start: 2021-12-08 | End: 2021-12-18

## 2021-12-08 NOTE — PROGRESS NOTES
[address] [facility], [address] [facility]  [address] [facility] [address]  Dept: [phone]    12/8/2021   Bariatric Surgery Patient Evaluation    Chief Complaint:  morbid obesity, preop 228, RYGB  11/ Oral Tab EC, Take 1 tablet (40 mg total) by mouth every morning before breakfast., Disp: 90 tablet, Rfl: 0  •  oxyCODONE 5 MG Oral Tab, Take 1 tablet (5 mg total) by mouth every 6 (six) hours as needed. , Disp: 10 tablet, Rfl: 0  •  acetaminophen 160 MG/5ML tiny amount of cloudy yellow fluid, not truly malodorous, but given her history of insulin resistance and obesity is a higher risk to turn into an infection. Thus the wound was left open and packed with gauze.     Plan:   Prescription for antibiotics given

## 2021-12-08 NOTE — PROGRESS NOTES
Instructed pt w/ assistance of .   Provided/reviewed bariatric post-op orientation and Bariatric HELP card; instructed pt to aim for 64 ozs fluids/day; pt stts drinking ~ 50-60 ozs/day; taking Bariatric vits 4x/day; meds to take/avoid; activities

## 2021-12-09 ENCOUNTER — TELEPHONE (OUTPATIENT)
Dept: SURGERY | Facility: CLINIC | Age: 36
End: 2021-12-09

## 2021-12-09 RX ORDER — OXYCODONE HYDROCHLORIDE 5 MG/1
5 TABLET ORAL EVERY 4 HOURS PRN
Qty: 10 TABLET | Refills: 0 | Status: SHIPPED | OUTPATIENT
Start: 2021-12-09

## 2021-12-09 NOTE — TELEPHONE ENCOUNTER
Patient is requesting a refill on Oxycodone  I called [facility] so Dr Odalys Workman can refill meds.

## 2021-12-15 ENCOUNTER — OFFICE VISIT (OUTPATIENT)
Dept: SURGERY | Facility: CLINIC | Age: 36
End: 2021-12-15
Payer: COMMERCIAL

## 2021-12-15 VITALS
OXYGEN SATURATION: 100 % | HEART RATE: 86 BPM | HEIGHT: 61.9 IN | BODY MASS INDEX: 37.17 KG/M2 | SYSTOLIC BLOOD PRESSURE: 100 MMHG | DIASTOLIC BLOOD PRESSURE: 80 MMHG | WEIGHT: 202 LBS

## 2021-12-15 DIAGNOSIS — E66.01 MORBID (SEVERE) OBESITY DUE TO EXCESS CALORIES (HCC): ICD-10-CM

## 2021-12-15 DIAGNOSIS — Z98.84 S/P GASTRIC BYPASS: Primary | ICD-10-CM

## 2021-12-15 NOTE — PROGRESS NOTES
[address] [address]  [address] [facility] [address]  Dept: [phone]    12/15/2021   Bariatric Surgery Patient Evaluation    Chief Complaint:  morbid obesity, preop 228, RYGB  11 mouth every 6 (six) hours as needed. , Disp: 10 tablet, Rfl: 0  •  acetaminophen 160 MG/5ML Oral Solution, Take 31.3 mL (1,000 mg total) by mouth every 8 (eight) hours. , Disp: 473 mL, Rfl: 2     Allergies:  No Known Allergies    ROS:      Constitutional: ne 2:32 PM

## 2021-12-16 ENCOUNTER — OFFICE VISIT (OUTPATIENT)
Dept: PULMONOLOGY | Facility: CLINIC | Age: 36
End: 2021-12-16
Payer: COMMERCIAL

## 2021-12-16 VITALS
BODY MASS INDEX: 37.17 KG/M2 | HEART RATE: 73 BPM | OXYGEN SATURATION: 97 % | HEIGHT: 62 IN | DIASTOLIC BLOOD PRESSURE: 72 MMHG | WEIGHT: 202 LBS | TEMPERATURE: 98 F | SYSTOLIC BLOOD PRESSURE: 115 MMHG | RESPIRATION RATE: 16 BRPM

## 2021-12-16 DIAGNOSIS — G47.33 OSA (OBSTRUCTIVE SLEEP APNEA): Primary | ICD-10-CM

## 2021-12-16 PROCEDURE — 99213 OFFICE O/P EST LOW 20 MIN: CPT | Performed by: PHYSICIAN ASSISTANT

## 2021-12-16 PROCEDURE — 3008F BODY MASS INDEX DOCD: CPT | Performed by: PHYSICIAN ASSISTANT

## 2021-12-16 PROCEDURE — 3074F SYST BP LT 130 MM HG: CPT | Performed by: PHYSICIAN ASSISTANT

## 2021-12-16 PROCEDURE — 3078F DIAST BP <80 MM HG: CPT | Performed by: PHYSICIAN ASSISTANT

## 2021-12-16 NOTE — PROGRESS NOTES
Pulmonary Progress Note      History of Present Illness:  Sivan Lam is a 39year old female presenting to pulmonary clinic today for follow-up. The [employer] medical translation service was utilized for this encounter.  She had gastric bypass a agreeable to trial of nasal style mask.   -Plan:  -Vigilance with CPAP every night all night  -Trial of nasal cushion mask to optimize comfort  -Weight loss  -Avoid alcohol  -Avoid sedating drugs  -Never drive if sleepy  -Follow-up at the 1-year interval a

## 2021-12-27 ENCOUNTER — OFFICE VISIT (OUTPATIENT)
Dept: SURGERY | Facility: CLINIC | Age: 36
End: 2021-12-27
Payer: COMMERCIAL

## 2021-12-27 VITALS — BODY MASS INDEX: 36.25 KG/M2 | HEIGHT: 62 IN | WEIGHT: 197 LBS

## 2021-12-27 DIAGNOSIS — Z98.84 S/P GASTRIC BYPASS: Primary | ICD-10-CM

## 2021-12-27 DIAGNOSIS — G47.33 OSA (OBSTRUCTIVE SLEEP APNEA): ICD-10-CM

## 2021-12-27 DIAGNOSIS — E66.01 CLASS 2 SEVERE OBESITY WITH SERIOUS COMORBIDITY AND BODY MASS INDEX (BMI) OF 36.0 TO 36.9 IN ADULT, UNSPECIFIED OBESITY TYPE (HCC): ICD-10-CM

## 2021-12-27 PROCEDURE — 3008F BODY MASS INDEX DOCD: CPT

## 2021-12-27 PROCEDURE — 97803 MED NUTRITION INDIV SUBSEQ: CPT

## 2021-12-28 NOTE — PROGRESS NOTES
[facility]  [address] [place] [address]  Dept: [phone]  Loc: [phone]    12/27/21      Bariatric Follow-up Nutrition Session    Colby Monsivais is a 39year old female. 09/18/2021     Lab Results   Component Value Date/Time    THIAMINE 161 09/18/2021 07:34 AM      No results found for: VITB1  Lab Results   Component Value Date/Time    FOLIC 84.3 72/23/3587 93:37 AM        Meds:     Current Outpatient Medications:   •  oxy Other varies, pt not tolerating much       Other:  Pt doing well, does report she cannot tolerate protein shakes anymore. 22% weight loss since surgery x 1 month, good % weight loss noted. Pt reports she has tried egg, some discomfort.  Tried cheese, did ok understands post-operative diet? Yes, doing as tolerated   Patient keeping consistent food records? No, not right now.        Janis Upton MS, RD, LDN  Bariatric Dietitian     Face-to-face time spent with pt: 30 minutes

## 2021-12-28 NOTE — PATIENT INSTRUCTIONS
Recomendaciones / objetivos:  1. Recomiende comprar proteína en polvo sin sabor. Unjury es italo jenny que puede encontrar en línea (posiblemente comprarla en [address]) y agregarla a todos los alimentos para aumentar la ingesta de proteínas.   2. Recomiende pro

## 2022-01-12 ENCOUNTER — OFFICE VISIT (OUTPATIENT)
Dept: SURGERY | Facility: CLINIC | Age: 37
End: 2022-01-12
Payer: COMMERCIAL

## 2022-01-12 VITALS
DIASTOLIC BLOOD PRESSURE: 70 MMHG | HEIGHT: 61.9 IN | HEART RATE: 75 BPM | SYSTOLIC BLOOD PRESSURE: 110 MMHG | WEIGHT: 190 LBS | OXYGEN SATURATION: 97 % | BODY MASS INDEX: 34.96 KG/M2

## 2022-01-12 DIAGNOSIS — Z98.84 S/P GASTRIC BYPASS: ICD-10-CM

## 2022-01-12 DIAGNOSIS — E66.01 MORBID (SEVERE) OBESITY DUE TO EXCESS CALORIES (HCC): ICD-10-CM

## 2022-01-12 DIAGNOSIS — G47.33 OSA (OBSTRUCTIVE SLEEP APNEA): Primary | Chronic | ICD-10-CM

## 2022-01-12 NOTE — PROGRESS NOTES
[address] [facility] [address]  [address] [facility] [address]  Dept: [phone]    1/12/2022   Bariatric Surgery Patient Evaluation    Chief Complaint:  morbid obesity, preop 228, RYGB 11/2 (eight) hours. , Disp: 473 mL, Rfl: 2     Allergies:  No Known Allergies    ROS:      Constitutional: negative  HEENT: negative  Respiratory: negative  Cardiovascular: negative  Gastrointestinal: negative  Genitourinary: negative  Musculoskeletal: negative

## 2022-01-21 ENCOUNTER — TELEPHONE (OUTPATIENT)
Dept: PULMONOLOGY | Facility: CLINIC | Age: 37
End: 2022-01-21

## 2022-01-21 NOTE — TELEPHONE ENCOUNTER
Received fax from [employer] [facility].  Placed on [employer]

## 2022-02-28 ENCOUNTER — OFFICE VISIT (OUTPATIENT)
Dept: SURGERY | Facility: CLINIC | Age: 37
End: 2022-02-28
Payer: COMMERCIAL

## 2022-02-28 VITALS
DIASTOLIC BLOOD PRESSURE: 80 MMHG | SYSTOLIC BLOOD PRESSURE: 118 MMHG | BODY MASS INDEX: 32.57 KG/M2 | OXYGEN SATURATION: 98 % | HEART RATE: 68 BPM | HEIGHT: 61.9 IN | WEIGHT: 177 LBS

## 2022-02-28 DIAGNOSIS — E55.9 VITAMIN D DEFICIENCY: ICD-10-CM

## 2022-02-28 DIAGNOSIS — E66.9 OBESITY (BMI 30-39.9): ICD-10-CM

## 2022-02-28 DIAGNOSIS — E78.00 HYPERCHOLESTEROLEMIA: ICD-10-CM

## 2022-02-28 DIAGNOSIS — G47.33 OSA (OBSTRUCTIVE SLEEP APNEA): ICD-10-CM

## 2022-02-28 DIAGNOSIS — Z98.84 S/P GASTRIC BYPASS: Primary | ICD-10-CM

## 2022-02-28 PROCEDURE — 3008F BODY MASS INDEX DOCD: CPT | Performed by: NURSE PRACTITIONER

## 2022-02-28 PROCEDURE — 3079F DIAST BP 80-89 MM HG: CPT | Performed by: NURSE PRACTITIONER

## 2022-02-28 PROCEDURE — 3074F SYST BP LT 130 MM HG: CPT | Performed by: NURSE PRACTITIONER

## 2022-02-28 PROCEDURE — 99214 OFFICE O/P EST MOD 30 MIN: CPT | Performed by: NURSE PRACTITIONER

## 2022-03-02 ENCOUNTER — OFFICE VISIT (OUTPATIENT)
Dept: SURGERY | Facility: CLINIC | Age: 37
End: 2022-03-02
Payer: COMMERCIAL

## 2022-03-02 VITALS
OXYGEN SATURATION: 98 % | DIASTOLIC BLOOD PRESSURE: 70 MMHG | WEIGHT: 175 LBS | HEART RATE: 67 BPM | BODY MASS INDEX: 32.2 KG/M2 | HEIGHT: 61.9 IN | SYSTOLIC BLOOD PRESSURE: 100 MMHG

## 2022-03-02 DIAGNOSIS — G47.33 OSA (OBSTRUCTIVE SLEEP APNEA): Chronic | ICD-10-CM

## 2022-03-02 DIAGNOSIS — Z98.84 S/P GASTRIC BYPASS: ICD-10-CM

## 2022-03-02 DIAGNOSIS — E78.00 HYPERCHOLESTEROLEMIA: Primary | ICD-10-CM

## 2022-03-07 ENCOUNTER — TELEPHONE (OUTPATIENT)
Dept: SURGERY | Facility: CLINIC | Age: 37
End: 2022-03-07

## 2022-03-07 NOTE — TELEPHONE ENCOUNTER
Appointment audit/chart review phone call per protocol. Per records, patient has appointments as follows:  Surgeon 02/28/2022  Dietician   Dalton Gaston 01/12/2022  Patient has been hospitalized or visited any ED since surgery. On 12/07 pt went to ED for postoperative seroma.

## 2022-05-24 ENCOUNTER — OFFICE VISIT (OUTPATIENT)
Dept: SURGERY | Facility: CLINIC | Age: 37
End: 2022-05-24
Payer: COMMERCIAL

## 2022-05-24 VITALS — BODY MASS INDEX: 30.86 KG/M2 | HEIGHT: 61.9 IN | WEIGHT: 167.69 LBS

## 2022-05-24 DIAGNOSIS — E78.00 HYPERCHOLESTEROLEMIA: ICD-10-CM

## 2022-05-24 DIAGNOSIS — E66.9 OBESITY (BMI 30-39.9): ICD-10-CM

## 2022-05-24 DIAGNOSIS — E88.81 INSULIN RESISTANCE: ICD-10-CM

## 2022-05-24 DIAGNOSIS — Z98.84 S/P GASTRIC BYPASS: ICD-10-CM

## 2022-05-24 PROCEDURE — 97803 MED NUTRITION INDIV SUBSEQ: CPT

## 2022-05-24 PROCEDURE — 3008F BODY MASS INDEX DOCD: CPT

## 2022-06-06 ENCOUNTER — OFFICE VISIT (OUTPATIENT)
Dept: SURGERY | Facility: CLINIC | Age: 37
End: 2022-06-06
Payer: COMMERCIAL

## 2022-06-06 VITALS
HEART RATE: 75 BPM | WEIGHT: 167 LBS | SYSTOLIC BLOOD PRESSURE: 112 MMHG | BODY MASS INDEX: 30.73 KG/M2 | OXYGEN SATURATION: 98 % | HEIGHT: 61.9 IN | DIASTOLIC BLOOD PRESSURE: 60 MMHG

## 2022-06-06 DIAGNOSIS — E55.9 VITAMIN D DEFICIENCY: ICD-10-CM

## 2022-06-06 DIAGNOSIS — E78.00 HYPERCHOLESTEROLEMIA: ICD-10-CM

## 2022-06-06 DIAGNOSIS — E66.9 OBESITY (BMI 30-39.9): ICD-10-CM

## 2022-06-06 DIAGNOSIS — G47.33 OSA (OBSTRUCTIVE SLEEP APNEA): ICD-10-CM

## 2022-06-06 DIAGNOSIS — Z98.84 S/P GASTRIC BYPASS: Primary | ICD-10-CM

## 2022-06-06 DIAGNOSIS — E88.81 INSULIN RESISTANCE: ICD-10-CM

## 2022-06-06 PROCEDURE — 3008F BODY MASS INDEX DOCD: CPT | Performed by: NURSE PRACTITIONER

## 2022-06-06 PROCEDURE — 3074F SYST BP LT 130 MM HG: CPT | Performed by: NURSE PRACTITIONER

## 2022-06-06 PROCEDURE — 99213 OFFICE O/P EST LOW 20 MIN: CPT | Performed by: NURSE PRACTITIONER

## 2022-06-06 PROCEDURE — 3078F DIAST BP <80 MM HG: CPT | Performed by: NURSE PRACTITIONER

## 2022-06-25 ENCOUNTER — LAB ENCOUNTER (OUTPATIENT)
Dept: LAB | Facility: HOSPITAL | Age: 37
End: 2022-06-25
Attending: NURSE PRACTITIONER
Payer: COMMERCIAL

## 2022-06-25 DIAGNOSIS — E66.9 OBESITY (BMI 30-39.9): ICD-10-CM

## 2022-06-25 DIAGNOSIS — E78.00 HYPERCHOLESTEROLEMIA: ICD-10-CM

## 2022-06-25 DIAGNOSIS — Z98.84 S/P GASTRIC BYPASS: ICD-10-CM

## 2022-06-25 DIAGNOSIS — G47.33 OSA (OBSTRUCTIVE SLEEP APNEA): ICD-10-CM

## 2022-06-25 DIAGNOSIS — E88.81 INSULIN RESISTANCE: ICD-10-CM

## 2022-06-25 DIAGNOSIS — E55.9 VITAMIN D DEFICIENCY: ICD-10-CM

## 2022-06-25 LAB
ALBUMIN SERPL-MCNC: 4 G/DL (ref 3.4–5)
ALBUMIN/GLOB SERPL: 1.1 {RATIO} (ref 1–2)
ALP LIVER SERPL-CCNC: 76 U/L
ALT SERPL-CCNC: 39 U/L
ANION GAP SERPL CALC-SCNC: 5 MMOL/L (ref 0–18)
AST SERPL-CCNC: 22 U/L (ref 15–37)
BASOPHILS # BLD AUTO: 0.08 X10(3) UL (ref 0–0.2)
BASOPHILS NFR BLD AUTO: 1 %
BILIRUB SERPL-MCNC: 0.7 MG/DL (ref 0.1–2)
BUN BLD-MCNC: 8 MG/DL (ref 7–18)
BUN/CREAT SERPL: 12.9 (ref 10–20)
CALCIUM BLD-MCNC: 9.1 MG/DL (ref 8.5–10.1)
CHLORIDE SERPL-SCNC: 105 MMOL/L (ref 98–112)
CHOLEST SERPL-MCNC: 150 MG/DL (ref ?–200)
CO2 SERPL-SCNC: 27 MMOL/L (ref 21–32)
CREAT BLD-MCNC: 0.62 MG/DL
DEPRECATED HBV CORE AB SER IA-ACNC: 119.5 NG/ML
DEPRECATED RDW RBC AUTO: 39 FL (ref 35.1–46.3)
EOSINOPHIL # BLD AUTO: 0.16 X10(3) UL (ref 0–0.7)
EOSINOPHIL NFR BLD AUTO: 1.9 %
ERYTHROCYTE [DISTWIDTH] IN BLOOD BY AUTOMATED COUNT: 12.2 % (ref 11–15)
EST. AVERAGE GLUCOSE BLD GHB EST-MCNC: 97 MG/DL (ref 68–126)
FASTING PATIENT LIPID ANSWER: YES
FASTING STATUS PATIENT QL REPORTED: YES
FOLATE SERPL-MCNC: 19.7 NG/ML (ref 8.7–?)
GLOBULIN PLAS-MCNC: 3.7 G/DL (ref 2.8–4.4)
GLUCOSE BLD-MCNC: 91 MG/DL (ref 70–99)
HBA1C MFR BLD: 5 % (ref ?–5.7)
HCT VFR BLD AUTO: 42.4 %
HDLC SERPL-MCNC: 34 MG/DL (ref 40–59)
HGB BLD-MCNC: 14 G/DL
IMM GRANULOCYTES # BLD AUTO: 0.03 X10(3) UL (ref 0–1)
IMM GRANULOCYTES NFR BLD: 0.4 %
IRON SATN MFR SERPL: 49 %
IRON SERPL-MCNC: 119 UG/DL
LDLC SERPL CALC-MCNC: 86 MG/DL (ref ?–100)
LYMPHOCYTES # BLD AUTO: 2.69 X10(3) UL (ref 1–4)
LYMPHOCYTES NFR BLD AUTO: 32.4 %
MCH RBC QN AUTO: 28.8 PG (ref 26–34)
MCHC RBC AUTO-ENTMCNC: 33 G/DL (ref 31–37)
MCV RBC AUTO: 87.2 FL
MONOCYTES # BLD AUTO: 0.6 X10(3) UL (ref 0.1–1)
MONOCYTES NFR BLD AUTO: 7.2 %
NEUTROPHILS # BLD AUTO: 4.75 X10 (3) UL (ref 1.5–7.7)
NEUTROPHILS # BLD AUTO: 4.75 X10(3) UL (ref 1.5–7.7)
NEUTROPHILS NFR BLD AUTO: 57.1 %
NONHDLC SERPL-MCNC: 116 MG/DL (ref ?–130)
OSMOLALITY SERPL CALC.SUM OF ELEC: 282 MOSM/KG (ref 275–295)
PLATELET # BLD AUTO: 442 10(3)UL (ref 150–450)
POTASSIUM SERPL-SCNC: 3.8 MMOL/L (ref 3.5–5.1)
PROT SERPL-MCNC: 7.7 G/DL (ref 6.4–8.2)
RBC # BLD AUTO: 4.86 X10(6)UL
SODIUM SERPL-SCNC: 137 MMOL/L (ref 136–145)
T4 FREE SERPL-MCNC: 0.9 NG/DL (ref 0.8–1.7)
TIBC SERPL-MCNC: 241 UG/DL (ref 240–450)
TRANSFERRIN SERPL-MCNC: 162 MG/DL (ref 200–360)
TRIGL SERPL-MCNC: 174 MG/DL (ref 30–149)
TSI SER-ACNC: 1.61 MIU/ML (ref 0.36–3.74)
VIT B12 SERPL-MCNC: 779 PG/ML (ref 193–986)
VIT D+METAB SERPL-MCNC: 27.8 NG/ML (ref 30–100)
VLDLC SERPL CALC-MCNC: 28 MG/DL (ref 0–30)
WBC # BLD AUTO: 8.3 X10(3) UL (ref 4–11)

## 2022-06-25 PROCEDURE — 82306 VITAMIN D 25 HYDROXY: CPT

## 2022-06-25 PROCEDURE — 36415 COLL VENOUS BLD VENIPUNCTURE: CPT

## 2022-06-25 PROCEDURE — 85025 COMPLETE CBC W/AUTO DIFF WBC: CPT

## 2022-06-25 PROCEDURE — 80053 COMPREHEN METABOLIC PANEL: CPT

## 2022-06-25 PROCEDURE — 82728 ASSAY OF FERRITIN: CPT

## 2022-06-25 PROCEDURE — 82746 ASSAY OF FOLIC ACID SERUM: CPT

## 2022-06-25 PROCEDURE — 84443 ASSAY THYROID STIM HORMONE: CPT

## 2022-06-25 PROCEDURE — 84466 ASSAY OF TRANSFERRIN: CPT

## 2022-06-25 PROCEDURE — 84425 ASSAY OF VITAMIN B-1: CPT

## 2022-06-25 PROCEDURE — 80061 LIPID PANEL: CPT

## 2022-06-25 PROCEDURE — 83036 HEMOGLOBIN GLYCOSYLATED A1C: CPT

## 2022-06-25 PROCEDURE — 83540 ASSAY OF IRON: CPT

## 2022-06-25 PROCEDURE — 82607 VITAMIN B-12: CPT

## 2022-06-25 PROCEDURE — 84439 ASSAY OF FREE THYROXINE: CPT

## 2022-06-30 LAB — VITAMIN B1 (THIAMINE), WHOLE B: 147 NMOL/L

## 2022-08-10 DIAGNOSIS — Z98.84 S/P GASTRIC BYPASS: ICD-10-CM

## 2022-08-10 DIAGNOSIS — E66.9 OBESITY (BMI 30-39.9): ICD-10-CM

## 2022-08-10 DIAGNOSIS — E55.9 VITAMIN D DEFICIENCY: ICD-10-CM

## 2022-08-10 DIAGNOSIS — E88.81 INSULIN RESISTANCE: ICD-10-CM

## 2022-08-10 DIAGNOSIS — E78.00 HYPERCHOLESTEROLEMIA: Primary | ICD-10-CM

## 2022-08-10 DIAGNOSIS — G47.33 OSA (OBSTRUCTIVE SLEEP APNEA): ICD-10-CM

## 2022-09-07 ENCOUNTER — OFFICE VISIT (OUTPATIENT)
Dept: SURGERY | Facility: CLINIC | Age: 37
End: 2022-09-07
Payer: COMMERCIAL

## 2022-09-07 VITALS
OXYGEN SATURATION: 99 % | HEART RATE: 69 BPM | DIASTOLIC BLOOD PRESSURE: 59 MMHG | SYSTOLIC BLOOD PRESSURE: 87 MMHG | BODY MASS INDEX: 26.93 KG/M2 | HEIGHT: 64.9 IN | WEIGHT: 161.63 LBS

## 2022-09-07 DIAGNOSIS — E66.01 MORBID OBESITY WITH BMI OF 40.0-44.9, ADULT (HCC): ICD-10-CM

## 2022-09-07 DIAGNOSIS — G47.33 OSA (OBSTRUCTIVE SLEEP APNEA): Primary | Chronic | ICD-10-CM

## 2022-09-07 DIAGNOSIS — Z98.84 S/P GASTRIC BYPASS: ICD-10-CM

## 2022-11-07 ENCOUNTER — OFFICE VISIT (OUTPATIENT)
Dept: SURGERY | Facility: CLINIC | Age: 37
End: 2022-11-07
Payer: COMMERCIAL

## 2022-11-07 VITALS
WEIGHT: 165 LBS | HEART RATE: 73 BPM | DIASTOLIC BLOOD PRESSURE: 70 MMHG | HEIGHT: 64.9 IN | BODY MASS INDEX: 27.49 KG/M2 | SYSTOLIC BLOOD PRESSURE: 106 MMHG | OXYGEN SATURATION: 96 %

## 2022-11-07 DIAGNOSIS — E88.81 INSULIN RESISTANCE: ICD-10-CM

## 2022-11-07 DIAGNOSIS — Z86.39 HX OF OBESITY: ICD-10-CM

## 2022-11-07 DIAGNOSIS — Z51.81 ENCOUNTER FOR THERAPEUTIC DRUG MONITORING: ICD-10-CM

## 2022-11-07 DIAGNOSIS — E78.00 HYPERCHOLESTEROLEMIA: ICD-10-CM

## 2022-11-07 DIAGNOSIS — Z98.84 S/P GASTRIC BYPASS: Primary | ICD-10-CM

## 2022-11-07 DIAGNOSIS — G47.33 OSA (OBSTRUCTIVE SLEEP APNEA): ICD-10-CM

## 2022-11-07 DIAGNOSIS — E55.9 VITAMIN D DEFICIENCY: ICD-10-CM

## 2022-11-29 ENCOUNTER — OFFICE VISIT (OUTPATIENT)
Dept: SURGERY | Facility: CLINIC | Age: 37
End: 2022-11-29
Payer: COMMERCIAL

## 2022-11-29 VITALS — WEIGHT: 164.81 LBS | BODY MASS INDEX: 30.33 KG/M2 | HEIGHT: 61.8 IN

## 2022-11-29 DIAGNOSIS — E88.81 INSULIN RESISTANCE: ICD-10-CM

## 2022-11-29 DIAGNOSIS — Z98.84 S/P GASTRIC BYPASS: ICD-10-CM

## 2022-11-29 DIAGNOSIS — E78.00 HYPERCHOLESTEROLEMIA: ICD-10-CM

## 2022-11-29 DIAGNOSIS — E55.9 VITAMIN D DEFICIENCY: ICD-10-CM

## 2022-11-29 DIAGNOSIS — E66.9 OBESITY (BMI 30-39.9): ICD-10-CM

## 2022-11-29 DIAGNOSIS — G47.33 OSA (OBSTRUCTIVE SLEEP APNEA): Chronic | ICD-10-CM

## 2022-11-29 PROCEDURE — 97803 MED NUTRITION INDIV SUBSEQ: CPT

## 2022-11-29 PROCEDURE — 3008F BODY MASS INDEX DOCD: CPT

## 2022-12-07 ENCOUNTER — OFFICE VISIT (OUTPATIENT)
Dept: SURGERY | Facility: CLINIC | Age: 37
End: 2022-12-07
Payer: COMMERCIAL

## 2022-12-07 VITALS
BODY MASS INDEX: 27.66 KG/M2 | HEIGHT: 64.9 IN | SYSTOLIC BLOOD PRESSURE: 102 MMHG | WEIGHT: 166 LBS | HEART RATE: 68 BPM | OXYGEN SATURATION: 96 % | DIASTOLIC BLOOD PRESSURE: 62 MMHG

## 2022-12-07 DIAGNOSIS — E66.01 MORBID (SEVERE) OBESITY DUE TO EXCESS CALORIES (HCC): ICD-10-CM

## 2022-12-07 DIAGNOSIS — E78.00 HYPERCHOLESTEROLEMIA: Primary | ICD-10-CM

## 2022-12-07 DIAGNOSIS — Z98.84 S/P GASTRIC BYPASS: ICD-10-CM

## 2022-12-07 DIAGNOSIS — E66.01 MORBID OBESITY WITH BMI OF 40.0-44.9, ADULT (HCC): ICD-10-CM

## 2023-07-21 ENCOUNTER — OFFICE VISIT (OUTPATIENT)
Dept: INTERNAL MEDICINE | Age: 38
End: 2023-07-21

## 2023-07-21 VITALS
TEMPERATURE: 97.6 F | OXYGEN SATURATION: 98 % | HEART RATE: 70 BPM | HEIGHT: 61 IN | BODY MASS INDEX: 32.45 KG/M2 | WEIGHT: 171.9 LBS | SYSTOLIC BLOOD PRESSURE: 100 MMHG | DIASTOLIC BLOOD PRESSURE: 58 MMHG

## 2023-07-21 DIAGNOSIS — Z01.818 PREOP EXAMINATION: Primary | ICD-10-CM

## 2023-07-21 DIAGNOSIS — E66.9 OBESITY (BMI 30.0-34.9): ICD-10-CM

## 2023-07-21 PROBLEM — E66.811 OBESITY (BMI 30.0-34.9): Status: ACTIVE | Noted: 2023-07-21

## 2023-07-21 PROBLEM — E78.00 HYPERCHOLESTEROLEMIA: Status: ACTIVE | Noted: 2022-02-28

## 2023-07-21 PROBLEM — E55.9 VITAMIN D DEFICIENCY: Status: ACTIVE | Noted: 2021-08-05

## 2023-07-21 PROBLEM — Z98.84 S/P GASTRIC BYPASS: Status: ACTIVE | Noted: 2021-12-08

## 2023-07-21 PROCEDURE — 99204 OFFICE O/P NEW MOD 45 MIN: CPT | Performed by: NURSE PRACTITIONER

## 2023-07-21 ASSESSMENT — ENCOUNTER SYMPTOMS
LIGHT-HEADEDNESS: 0
FEVER: 0
HEADACHES: 0
CHILLS: 0
COUGH: 0
BRUISES/BLEEDS EASILY: 0
SHORTNESS OF BREATH: 0
WHEEZING: 0
ADENOPATHY: 0
DIZZINESS: 0
ABDOMINAL PAIN: 0

## 2023-07-21 ASSESSMENT — PATIENT HEALTH QUESTIONNAIRE - PHQ9
SUM OF ALL RESPONSES TO PHQ9 QUESTIONS 1 AND 2: 0
1. LITTLE INTEREST OR PLEASURE IN DOING THINGS: NOT AT ALL
2. FEELING DOWN, DEPRESSED OR HOPELESS: NOT AT ALL
SUM OF ALL RESPONSES TO PHQ9 QUESTIONS 1 AND 2: 0
CLINICAL INTERPRETATION OF PHQ2 SCORE: NO FURTHER SCREENING NEEDED

## 2023-07-21 ASSESSMENT — PAIN SCALES - GENERAL: PAINLEVEL: 0

## 2023-07-22 ENCOUNTER — LAB SERVICES (OUTPATIENT)
Dept: LAB | Age: 38
End: 2023-07-22

## 2023-07-22 ENCOUNTER — ANCILLARY PROCEDURE (OUTPATIENT)
Dept: CARDIOLOGY | Age: 38
End: 2023-07-22
Attending: NURSE PRACTITIONER

## 2023-07-22 DIAGNOSIS — Z01.818 PREOP EXAMINATION: ICD-10-CM

## 2023-07-22 LAB
ALBUMIN SERPL-MCNC: 3.8 G/DL (ref 3.6–5.1)
ALBUMIN/GLOB SERPL: 1.1 {RATIO} (ref 1–2.4)
ALP SERPL-CCNC: 81 UNITS/L (ref 45–117)
ALT SERPL-CCNC: 57 UNITS/L
ANION GAP SERPL CALC-SCNC: 10 MMOL/L (ref 7–19)
APPEARANCE UR: CLEAR
AST SERPL-CCNC: 19 UNITS/L
BILIRUB SERPL-MCNC: 0.8 MG/DL (ref 0.2–1)
BILIRUB UR QL STRIP: NEGATIVE
BUN SERPL-MCNC: 12 MG/DL (ref 6–20)
BUN/CREAT SERPL: 17 (ref 7–25)
CALCIUM SERPL-MCNC: 9 MG/DL (ref 8.4–10.2)
CHLORIDE SERPL-SCNC: 104 MMOL/L (ref 97–110)
CO2 SERPL-SCNC: 27 MMOL/L (ref 21–32)
COLOR UR: NORMAL
CREAT SERPL-MCNC: 0.7 MG/DL (ref 0.51–0.95)
FASTING DURATION TIME PATIENT: NORMAL H
GFR SERPLBLD BASED ON 1.73 SQ M-ARVRAT: >90 ML/MIN
GLOBULIN SER-MCNC: 3.5 G/DL (ref 2–4)
GLUCOSE SERPL-MCNC: 96 MG/DL (ref 70–99)
GLUCOSE UR STRIP-MCNC: NEGATIVE MG/DL
HGB UR QL STRIP: NEGATIVE
KETONES UR STRIP-MCNC: NEGATIVE MG/DL
LEUKOCYTE ESTERASE UR QL STRIP: NEGATIVE
NITRITE UR QL STRIP: NEGATIVE
PH UR STRIP: 6.5 [PH] (ref 5–7)
POTASSIUM SERPL-SCNC: 4.2 MMOL/L (ref 3.4–5.1)
PROT SERPL-MCNC: 7.3 G/DL (ref 6.4–8.2)
PROT UR STRIP-MCNC: NEGATIVE MG/DL
SODIUM SERPL-SCNC: 137 MMOL/L (ref 135–145)
SP GR UR STRIP: 1.01 (ref 1–1.03)
UROBILINOGEN UR STRIP-MCNC: 0.2 MG/DL

## 2023-07-22 PROCEDURE — 87389 HIV-1 AG W/HIV-1&-2 AB AG IA: CPT | Performed by: INTERNAL MEDICINE

## 2023-07-22 PROCEDURE — 84703 CHORIONIC GONADOTROPIN ASSAY: CPT | Performed by: INTERNAL MEDICINE

## 2023-07-22 PROCEDURE — 85025 COMPLETE CBC W/AUTO DIFF WBC: CPT | Performed by: INTERNAL MEDICINE

## 2023-07-22 PROCEDURE — 93000 ELECTROCARDIOGRAM COMPLETE: CPT | Performed by: INTERNAL MEDICINE

## 2023-07-22 PROCEDURE — 85610 PROTHROMBIN TIME: CPT | Performed by: INTERNAL MEDICINE

## 2023-07-22 PROCEDURE — 85730 THROMBOPLASTIN TIME PARTIAL: CPT | Performed by: INTERNAL MEDICINE

## 2023-07-22 PROCEDURE — 36415 COLL VENOUS BLD VENIPUNCTURE: CPT | Performed by: NURSE PRACTITIONER

## 2023-07-22 PROCEDURE — 81003 URINALYSIS AUTO W/O SCOPE: CPT | Performed by: INTERNAL MEDICINE

## 2023-07-22 PROCEDURE — 80053 COMPREHEN METABOLIC PANEL: CPT | Performed by: INTERNAL MEDICINE

## 2023-07-23 LAB
APTT PPP: 29 SEC (ref 22–30)
BASOPHILS # BLD: 0.1 K/MCL (ref 0–0.3)
BASOPHILS NFR BLD: 1 %
DEPRECATED RDW RBC: 38.1 FL (ref 39–50)
EOSINOPHIL # BLD: 0.2 K/MCL (ref 0–0.5)
EOSINOPHIL NFR BLD: 3 %
ERYTHROCYTE [DISTWIDTH] IN BLOOD: 12 % (ref 11–15)
HCG SERPL QL: NEGATIVE
HCT VFR BLD CALC: 40.5 % (ref 36–46.5)
HGB BLD-MCNC: 13.5 G/DL (ref 12–15.5)
HIV 1+2 AB+HIV1 P24 AG SERPL QL IA: NONREACTIVE
IMM GRANULOCYTES # BLD AUTO: 0 K/MCL (ref 0–0.2)
IMM GRANULOCYTES # BLD: 0 %
INR PPP: 1
LYMPHOCYTES # BLD: 2.5 K/MCL (ref 1–4.8)
LYMPHOCYTES NFR BLD: 31 %
MCH RBC QN AUTO: 28.7 PG (ref 26–34)
MCHC RBC AUTO-ENTMCNC: 33.3 G/DL (ref 32–36.5)
MCV RBC AUTO: 86.2 FL (ref 78–100)
MONOCYTES # BLD: 0.6 K/MCL (ref 0.3–0.9)
MONOCYTES NFR BLD: 7 %
NEUTROPHILS # BLD: 4.5 K/MCL (ref 1.8–7.7)
NEUTROPHILS NFR BLD: 58 %
NRBC BLD MANUAL-RTO: 0 /100 WBC
PLATELET # BLD AUTO: 472 K/MCL (ref 140–450)
PROTHROMBIN TIME: 10.6 SEC (ref 9.7–11.8)
RBC # BLD: 4.7 MIL/MCL (ref 4–5.2)
WBC # BLD: 7.9 K/MCL (ref 4.2–11)

## 2023-07-24 ENCOUNTER — TELEPHONE (OUTPATIENT)
Dept: INTERNAL MEDICINE | Age: 38
End: 2023-07-24

## 2023-07-26 ENCOUNTER — TELEPHONE (OUTPATIENT)
Dept: INTERNAL MEDICINE | Age: 38
End: 2023-07-26

## 2023-07-26 DIAGNOSIS — Z01.818 PREOP EXAMINATION: Primary | ICD-10-CM

## 2023-07-27 ENCOUNTER — TELEPHONE (OUTPATIENT)
Dept: INTERNAL MEDICINE | Age: 38
End: 2023-07-27

## 2023-07-27 ENCOUNTER — LAB SERVICES (OUTPATIENT)
Dept: LAB | Age: 38
End: 2023-07-27

## 2023-07-27 DIAGNOSIS — Z01.818 PREOP EXAMINATION: ICD-10-CM

## 2023-07-27 LAB
BASOPHILS # BLD: 0.1 K/MCL (ref 0–0.3)
BASOPHILS NFR BLD: 1 %
DEPRECATED RDW RBC: 37.3 FL (ref 39–50)
EOSINOPHIL # BLD: 0.2 K/MCL (ref 0–0.5)
EOSINOPHIL NFR BLD: 2 %
ERYTHROCYTE [DISTWIDTH] IN BLOOD: 12.2 % (ref 11–15)
HCT VFR BLD CALC: 36.5 % (ref 36–46.5)
HGB BLD-MCNC: 12.1 G/DL (ref 12–15.5)
IMM GRANULOCYTES # BLD AUTO: 0.1 K/MCL (ref 0–0.2)
IMM GRANULOCYTES # BLD: 1 %
LYMPHOCYTES # BLD: 2.6 K/MCL (ref 1–4.8)
LYMPHOCYTES NFR BLD: 24 %
MCH RBC QN AUTO: 28.4 PG (ref 26–34)
MCHC RBC AUTO-ENTMCNC: 33.2 G/DL (ref 32–36.5)
MCV RBC AUTO: 85.7 FL (ref 78–100)
MONOCYTES # BLD: 0.8 K/MCL (ref 0.3–0.9)
MONOCYTES NFR BLD: 8 %
NEUTROPHILS # BLD: 6.9 K/MCL (ref 1.8–7.7)
NEUTROPHILS NFR BLD: 64 %
NRBC BLD MANUAL-RTO: 0 /100 WBC
PLATELET # BLD AUTO: 458 K/MCL (ref 140–450)
RBC # BLD: 4.26 MIL/MCL (ref 4–5.2)
WBC # BLD: 10.7 K/MCL (ref 4.2–11)

## 2023-07-27 PROCEDURE — 36415 COLL VENOUS BLD VENIPUNCTURE: CPT | Performed by: FAMILY MEDICINE

## 2023-07-27 PROCEDURE — 85025 COMPLETE CBC W/AUTO DIFF WBC: CPT | Performed by: INTERNAL MEDICINE

## 2023-07-31 ENCOUNTER — TELEPHONE (OUTPATIENT)
Dept: INTERNAL MEDICINE | Age: 38
End: 2023-07-31

## 2023-07-31 ENCOUNTER — E-ADVICE (OUTPATIENT)
Dept: INTERNAL MEDICINE | Age: 38
End: 2023-07-31

## 2023-07-31 ENCOUNTER — APPOINTMENT (OUTPATIENT)
Dept: INTERNAL MEDICINE | Age: 38
End: 2023-07-31

## 2023-07-31 DIAGNOSIS — D69.1 ABNORMAL PLATELETS (CMD): Primary | ICD-10-CM

## 2023-08-01 ENCOUNTER — LAB SERVICES (OUTPATIENT)
Dept: LAB | Age: 38
End: 2023-08-01

## 2023-08-01 DIAGNOSIS — D69.1 ABNORMAL PLATELETS (CMD): ICD-10-CM

## 2023-08-01 LAB
BASOPHILS # BLD: 0.1 K/MCL (ref 0–0.3)
BASOPHILS NFR BLD: 1 %
DEPRECATED RDW RBC: 37.7 FL (ref 39–50)
EOSINOPHIL # BLD: 0.2 K/MCL (ref 0–0.5)
EOSINOPHIL NFR BLD: 2 %
ERYTHROCYTE [DISTWIDTH] IN BLOOD: 12.1 % (ref 11–15)
HCT VFR BLD CALC: 39 % (ref 36–46.5)
HGB BLD-MCNC: 13.2 G/DL (ref 12–15.5)
IMM GRANULOCYTES # BLD AUTO: 0 K/MCL (ref 0–0.2)
IMM GRANULOCYTES # BLD: 0 %
LYMPHOCYTES # BLD: 2.6 K/MCL (ref 1–4.8)
LYMPHOCYTES NFR BLD: 30 %
MCH RBC QN AUTO: 29 PG (ref 26–34)
MCHC RBC AUTO-ENTMCNC: 33.8 G/DL (ref 32–36.5)
MCV RBC AUTO: 85.7 FL (ref 78–100)
MONOCYTES # BLD: 0.6 K/MCL (ref 0.3–0.9)
MONOCYTES NFR BLD: 7 %
NEUTROPHILS # BLD: 5 K/MCL (ref 1.8–7.7)
NEUTROPHILS NFR BLD: 60 %
NRBC BLD MANUAL-RTO: 0 /100 WBC
PLATELET # BLD AUTO: 438 K/MCL (ref 140–450)
RBC # BLD: 4.55 MIL/MCL (ref 4–5.2)
WBC # BLD: 8.5 K/MCL (ref 4.2–11)

## 2023-08-01 PROCEDURE — 85025 COMPLETE CBC W/AUTO DIFF WBC: CPT | Performed by: INTERNAL MEDICINE

## 2023-08-01 PROCEDURE — 36415 COLL VENOUS BLD VENIPUNCTURE: CPT | Performed by: NURSE PRACTITIONER

## 2023-08-02 ENCOUNTER — APPOINTMENT (OUTPATIENT)
Dept: INTERNAL MEDICINE | Age: 38
End: 2023-08-02

## 2023-08-03 ENCOUNTER — E-ADVICE (OUTPATIENT)
Dept: INTERNAL MEDICINE | Age: 38
End: 2023-08-03

## 2023-08-03 ENCOUNTER — TELEPHONE (OUTPATIENT)
Dept: INTERNAL MEDICINE | Age: 38
End: 2023-08-03

## 2024-05-21 ENCOUNTER — TELEPHONE (OUTPATIENT)
Dept: SURGERY | Facility: CLINIC | Age: 39
End: 2024-05-21

## 2024-08-13 ENCOUNTER — NURSE TRIAGE (OUTPATIENT)
Dept: TELEHEALTH | Age: 39
End: 2024-08-13

## 2024-08-22 ENCOUNTER — APPOINTMENT (OUTPATIENT)
Dept: INTERNAL MEDICINE | Age: 39
End: 2024-08-22

## 2024-10-03 ENCOUNTER — APPOINTMENT (OUTPATIENT)
Dept: INTERNAL MEDICINE | Age: 39
End: 2024-10-03

## 2024-10-03 VITALS
SYSTOLIC BLOOD PRESSURE: 100 MMHG | WEIGHT: 175 LBS | HEIGHT: 61 IN | DIASTOLIC BLOOD PRESSURE: 60 MMHG | TEMPERATURE: 96.3 F | BODY MASS INDEX: 33.04 KG/M2

## 2024-10-03 DIAGNOSIS — I95.9 HYPOTENSION, UNSPECIFIED HYPOTENSION TYPE: Primary | ICD-10-CM

## 2024-10-03 PROCEDURE — 99214 OFFICE O/P EST MOD 30 MIN: CPT | Performed by: INTERNAL MEDICINE

## 2024-10-03 ASSESSMENT — ENCOUNTER SYMPTOMS
EYE REDNESS: 0
PHOTOPHOBIA: 0
EYE ITCHING: 0
COLOR CHANGE: 0
GASTROINTESTINAL NEGATIVE: 1
ACTIVITY CHANGE: 0
DIZZINESS: 0
PSYCHIATRIC NEGATIVE: 1
CHOKING: 0
ENDOCRINE NEGATIVE: 1
EYES NEGATIVE: 1
NEUROLOGICAL NEGATIVE: 1
CHILLS: 0
APPETITE CHANGE: 0
EYE DISCHARGE: 0
RESPIRATORY NEGATIVE: 1
EYE PAIN: 0
CONSTITUTIONAL NEGATIVE: 1
CHEST TIGHTNESS: 0
WOUND: 0
APNEA: 0

## 2024-10-03 ASSESSMENT — PATIENT HEALTH QUESTIONNAIRE - PHQ9
SUM OF ALL RESPONSES TO PHQ9 QUESTIONS 1 AND 2: 0
SUM OF ALL RESPONSES TO PHQ9 QUESTIONS 1 AND 2: 0
1. LITTLE INTEREST OR PLEASURE IN DOING THINGS: NOT AT ALL
CLINICAL INTERPRETATION OF PHQ2 SCORE: NO FURTHER SCREENING NEEDED
2. FEELING DOWN, DEPRESSED OR HOPELESS: NOT AT ALL

## 2024-10-03 ASSESSMENT — PAIN SCALES - GENERAL: PAINLEVEL: 0

## 2024-10-12 ENCOUNTER — APPOINTMENT (OUTPATIENT)
Dept: LAB | Age: 39
End: 2024-10-12

## 2024-10-12 DIAGNOSIS — I95.9 HYPOTENSION, UNSPECIFIED HYPOTENSION TYPE: ICD-10-CM

## 2024-10-13 LAB
ALBUMIN SERPL-MCNC: 3.7 G/DL (ref 3.4–5)
ALBUMIN/GLOB SERPL: 1.1 {RATIO} (ref 1–2.4)
ALP SERPL-CCNC: 75 UNITS/L (ref 45–117)
ALT SERPL-CCNC: 23 UNITS/L
ANION GAP SERPL CALC-SCNC: 15 MMOL/L (ref 7–19)
AST SERPL-CCNC: 23 UNITS/L
BASOPHILS # BLD: 0.1 K/MCL (ref 0–0.3)
BASOPHILS NFR BLD: 1 %
BILIRUB SERPL-MCNC: 0.5 MG/DL (ref 0.2–1)
BUN SERPL-MCNC: 12 MG/DL (ref 6–20)
BUN/CREAT SERPL: 21 (ref 7–25)
CALCIUM SERPL-MCNC: 8.8 MG/DL (ref 8.4–10.2)
CHLORIDE SERPL-SCNC: 108 MMOL/L (ref 97–110)
CHOLEST SERPL-MCNC: 146 MG/DL
CHOLEST/HDLC SERPL: 4.4 {RATIO}
CO2 SERPL-SCNC: 22 MMOL/L (ref 21–32)
CREAT SERPL-MCNC: 0.56 MG/DL (ref 0.51–0.95)
DEPRECATED RDW RBC: 37.8 FL (ref 39–50)
EGFRCR SERPLBLD CKD-EPI 2021: >90 ML/MIN/{1.73_M2}
EOSINOPHIL # BLD: 0.3 K/MCL (ref 0–0.5)
EOSINOPHIL NFR BLD: 2 %
ERYTHROCYTE [DISTWIDTH] IN BLOOD: 12.4 % (ref 11–15)
FASTING DURATION TIME PATIENT: NORMAL H
GLOBULIN SER-MCNC: 3.5 G/DL (ref 2–4)
GLUCOSE SERPL-MCNC: 87 MG/DL (ref 70–99)
HCT VFR BLD CALC: 40.2 % (ref 36–46.5)
HDLC SERPL-MCNC: 33 MG/DL
HGB BLD-MCNC: 13.1 G/DL (ref 12–15.5)
IMM GRANULOCYTES # BLD AUTO: 0 K/MCL (ref 0–0.2)
IMM GRANULOCYTES # BLD: 0 %
LDLC SERPL CALC-MCNC: 84 MG/DL
LYMPHOCYTES # BLD: 3.1 K/MCL (ref 1–4.8)
LYMPHOCYTES NFR BLD: 30 %
MCH RBC QN AUTO: 27.5 PG (ref 26–34)
MCHC RBC AUTO-ENTMCNC: 32.6 G/DL (ref 32–36.5)
MCV RBC AUTO: 84.5 FL (ref 78–100)
MONOCYTES # BLD: 0.8 K/MCL (ref 0.3–0.9)
MONOCYTES NFR BLD: 7 %
NEUTROPHILS # BLD: 6.3 K/MCL (ref 1.8–7.7)
NEUTROPHILS NFR BLD: 60 %
NONHDLC SERPL-MCNC: 113 MG/DL
NRBC BLD MANUAL-RTO: 0 /100 WBC
PLATELET # BLD AUTO: 429 K/MCL (ref 140–450)
POTASSIUM SERPL-SCNC: 4.5 MMOL/L (ref 3.4–5.1)
PROT SERPL-MCNC: 7.2 G/DL (ref 6.4–8.2)
RBC # BLD: 4.76 MIL/MCL (ref 4–5.2)
SODIUM SERPL-SCNC: 140 MMOL/L (ref 135–145)
TRIGL SERPL-MCNC: 144 MG/DL
TSH SERPL-ACNC: 1.81 MCUNITS/ML (ref 0.35–5)
WBC # BLD: 10.5 K/MCL (ref 4.2–11)

## 2024-11-05 ENCOUNTER — TELEPHONE (OUTPATIENT)
Dept: CARDIOLOGY | Age: 39
End: 2024-11-05

## 2024-11-06 ENCOUNTER — APPOINTMENT (OUTPATIENT)
Dept: CARDIOLOGY | Age: 39
End: 2024-11-06
Attending: INTERNAL MEDICINE

## 2025-01-10 ENCOUNTER — APPOINTMENT (OUTPATIENT)
Dept: CARDIOLOGY | Age: 40
End: 2025-01-10
Attending: INTERNAL MEDICINE

## 2025-01-10 VITALS
DIASTOLIC BLOOD PRESSURE: 66 MMHG | WEIGHT: 175.93 LBS | BODY MASS INDEX: 33.22 KG/M2 | HEART RATE: 66 BPM | HEIGHT: 61 IN | SYSTOLIC BLOOD PRESSURE: 110 MMHG

## 2025-01-10 DIAGNOSIS — I95.9 HYPOTENSION, UNSPECIFIED HYPOTENSION TYPE: Primary | ICD-10-CM

## 2025-01-10 DIAGNOSIS — R55 SYNCOPE AND COLLAPSE: ICD-10-CM

## 2025-01-10 PROCEDURE — 99204 OFFICE O/P NEW MOD 45 MIN: CPT | Performed by: INTERNAL MEDICINE

## 2025-01-13 DIAGNOSIS — I95.9 HYPOTENSION, UNSPECIFIED HYPOTENSION TYPE: ICD-10-CM

## 2025-01-13 PROCEDURE — 93000 ELECTROCARDIOGRAM COMPLETE: CPT | Performed by: INTERNAL MEDICINE

## 2025-01-27 ENCOUNTER — APPOINTMENT (OUTPATIENT)
Dept: CARDIOLOGY | Age: 40
End: 2025-01-27
Attending: INTERNAL MEDICINE

## 2025-02-07 ENCOUNTER — APPOINTMENT (OUTPATIENT)
Dept: CARDIOLOGY | Age: 40
End: 2025-02-07
Attending: INTERNAL MEDICINE

## 2025-02-07 DIAGNOSIS — I95.9 HYPOTENSION, UNSPECIFIED HYPOTENSION TYPE: ICD-10-CM

## 2025-02-07 DIAGNOSIS — R55 SYNCOPE AND COLLAPSE: ICD-10-CM

## 2025-02-07 LAB
AORTIC VALVE AREA (AVA): 0.78
ASCENDING AORTA (AAD): 3
AV STENOSIS SEVERITY TEXT: NORMAL
AVI LVOT PEAK GRADIENT (LVOTMG): 0.9
E WAVE DECELARATION TIME (MDT): 7.46
INTERVENTRICULAR SEPTUM IN END DIASTOLE (IVSD): 2.22
LEFT INTERNAL DIMENSION IN SYSTOLE (LVSD): 0.8
LEFT VENTRICULAR INTERNAL DIMENSION IN DIASTOLE (LVDD): 3
LEFT VENTRICULAR POSTERIOR WALL IN END DIASTOLE (LVPW): 4.1
LV EF: NORMAL %
MV E TISSUE VEL MED (MESV): 19.8
MV E WAVE VEL/E TISSUE VEL MED(MSR): 10.4
MV PEAK A VELOCITY (MVPAV): 220
MV PEAK E VELOCITY (MVPEV): 0.65
TRICUSPID VALVE ANNULAR PEAK VELOCITY (TVAPV): 18
TRICUSPID VALVE PEAK REGURGITATION VELOCITY (TRPV): 2.3

## 2025-02-07 PROCEDURE — 93306 TTE W/DOPPLER COMPLETE: CPT | Performed by: INTERNAL MEDICINE

## 2025-02-21 ENCOUNTER — LAB ENCOUNTER (OUTPATIENT)
Dept: LAB | Facility: HOSPITAL | Age: 40
End: 2025-02-21
Attending: NURSE PRACTITIONER
Payer: COMMERCIAL

## 2025-02-21 ENCOUNTER — OFFICE VISIT (OUTPATIENT)
Dept: SURGERY | Facility: CLINIC | Age: 40
End: 2025-02-21
Payer: COMMERCIAL

## 2025-02-21 VITALS
HEART RATE: 74 BPM | HEIGHT: 64.9 IN | WEIGHT: 179 LBS | SYSTOLIC BLOOD PRESSURE: 122 MMHG | OXYGEN SATURATION: 99 % | BODY MASS INDEX: 29.82 KG/M2 | DIASTOLIC BLOOD PRESSURE: 80 MMHG

## 2025-02-21 DIAGNOSIS — E66.9 OBESITY (BMI 30-39.9): ICD-10-CM

## 2025-02-21 DIAGNOSIS — Z51.81 ENCOUNTER FOR THERAPEUTIC DRUG MONITORING: ICD-10-CM

## 2025-02-21 DIAGNOSIS — Z98.84 HISTORY OF GASTRIC BYPASS: ICD-10-CM

## 2025-02-21 DIAGNOSIS — G47.33 OSA (OBSTRUCTIVE SLEEP APNEA): Primary | ICD-10-CM

## 2025-02-21 LAB
DEPRECATED HBV CORE AB SER IA-ACNC: 8 NG/ML
EST. AVERAGE GLUCOSE BLD GHB EST-MCNC: 108 MG/DL (ref 68–126)
FOLATE SERPL-MCNC: 16.7 NG/ML (ref 5.4–?)
HBA1C MFR BLD: 5.4 % (ref ?–5.7)
IRON SATN MFR SERPL: 22 %
IRON SERPL-MCNC: 70 UG/DL
TOTAL IRON BINDING CAPACITY: 320 UG/DL (ref 250–425)
TRANSFERRIN SERPL-MCNC: 252 MG/DL (ref 250–380)
VIT B12 SERPL-MCNC: 544 PG/ML (ref 211–911)
VIT D+METAB SERPL-MCNC: 11.9 NG/ML (ref 30–100)

## 2025-02-21 PROCEDURE — 84425 ASSAY OF VITAMIN B-1: CPT

## 2025-02-21 PROCEDURE — 99214 OFFICE O/P EST MOD 30 MIN: CPT | Performed by: NURSE PRACTITIONER

## 2025-02-21 PROCEDURE — 83540 ASSAY OF IRON: CPT

## 2025-02-21 PROCEDURE — 82746 ASSAY OF FOLIC ACID SERUM: CPT

## 2025-02-21 PROCEDURE — 83036 HEMOGLOBIN GLYCOSYLATED A1C: CPT

## 2025-02-21 PROCEDURE — 82728 ASSAY OF FERRITIN: CPT

## 2025-02-21 PROCEDURE — 36415 COLL VENOUS BLD VENIPUNCTURE: CPT

## 2025-02-21 PROCEDURE — 82607 VITAMIN B-12: CPT

## 2025-02-21 PROCEDURE — 82306 VITAMIN D 25 HYDROXY: CPT

## 2025-02-21 PROCEDURE — 84466 ASSAY OF TRANSFERRIN: CPT

## 2025-02-21 RX ORDER — PHENTERMINE HYDROCHLORIDE 37.5 MG/1
37.5 TABLET ORAL
Qty: 30 TABLET | Refills: 3 | Status: SHIPPED | OUTPATIENT
Start: 2025-02-21

## 2025-02-21 NOTE — PROGRESS NOTES
[facility], [facility], [place]  [address] [facility] [address]  [facility] [address]  Dept: [phone]      Patient:  Margie Heard  :      1985  MRN:      [id number]    Referring Provider:    Chief Complaint:     Chief Complaint   Patient presents with    Follow - Up    Post-Op     Post-op  rygb      Date of Surgery: 2021  Surgery Type: Laparoscopic Candelaria-en-Y Gastric Bypass, EGD, TAP block  Surgeon: Dr. Lopez    Subjective     #[id number]    Last OV 22.  Moved to [place] since last visit. Has since moved back to [place].     Presents to clinic for assistance with weight loss/maintenance.   Requires reconstruction surgery in [facility] after complications of excessive skin removal surgery of arms and pannus in [place]. Has been instructed to lose weight prior to surgery (10 kg, 22 lbs).   +weight regain.     Satiety:  Positive    Food Intake:    Follow up with Kayla MONA at this time    Fluid Intake:  Adequate oz water    Protein Intake:  Aim for 65 grams    Vitamin:  Yes     Exercise: Yes   Cardio, weights     Comorbidities:  SOB/JONAS-Improvement?  yes, Joint pain-Improvement?  yes, GERD-Improvement?  yes, Hyperlipidemia-Improvement?  yes and Sleep apnea-Improvement?  yes    Objective     Vitals: /80   Pulse 74   Ht 5' 4.9\" (1.648 m)   Wt 179 lb (81.2 kg)   SpO2 99%   BMI 29.88 kg/m²     Starting weight: 229   Current weight: 179   Interval weight loss: +14   Total weight loss:  -50    Last 3 Weights   25 1006 179 lb (81.2 kg)   22 0844 166 lb (75.3 kg)   22 1625 164 lb 12.8 oz (74.8 kg)       Patient Medications:    Current Outpatient Medications:     Phentermine HCl 37.5 MG Oral Tab, Take 1 tablet (37.5 mg total) by mouth every morning before breakfast. Start with 1/2 tablet daily, increase to full tablet daily as tolerated, Disp: 30 tablet, Rfl: 3    cholecalciferol 1.25 MG (10019 UT) Oral Cap, Take 1 capsule (1.25 mg total) by  mouth every 7 days., Disp: 4 capsule, Rfl: 2    Allergies:  Patient has no known allergies.     Social History:  Reviewed     Surgical History:    Past Surgical History:   Procedure Laterality Date          Gastric bypass,obesity,sb reconstruc  2021    laparoscopic possible open candelraia-en-y gastric bypass        Family History:    Family History   Problem Relation Age of Onset    High Blood Pressure Mother     Heart Attack Father 55       Physical Exam:    General appearance: alert, appears stated age, cooperative and obese   Head: Normocephalic, without obvious abnormality, atraumatic  Neck: no adenopathy, no carotid bruit, no JVD, supple, symmetrical, trachea midline, and thyroid not enlarged, symmetric, no tenderness/mass/nodules  Lungs: clear to auscultation bilaterally  Heart: S1, S2 normal, no murmur, click, rub or gallop, regular rate and rhythm  Abdomen: soft, non-tender; bowel sounds normal; no masses,  no organomegaly  Extremities: extremities normal, atraumatic, no cyanosis or edema  Pulses: 2+ and symmetric  Skin: Skin color, texture, turgor normal. No rashes or lesions  Neurologic: Grossly normal   Scar/surgical skin changes noted on posterior arms, pannus    ROS:    Constitutional: negative  Respiratory: negative  Cardiovascular: negative  Gastrointestinal: negative  Genitourinary:negative  Integument/breast: negative  Hematologic/lymphatic: negative  Musculoskeletal:negative  Neurological: negative  Behavioral/Psych: negative  Endocrine: negative   All other systems were reviewed and are negative    Assessment       Encounter Diagnosis(ses):   Encounter Diagnoses   Name Primary?    SABRINA (obstructive sleep apnea) Yes    Encounter for therapeutic drug monitoring     History of gastric bypass     Obesity (BMI 30-39.9)        Plan     Date of Surgery: 2021  Surgery Type: Laparoscopic Candelaria-en-Y Gastric Bypass, EGD, TAP block  Surgeon: Dr. Lopez      SABRINA: Monitor post operatively.      OBESITY:     Doing well post operatively.     Advised patient to continue to avoid pop, smoking, caffeine, and carbonated drinks.     Reviewed bariatric surgery post op course and expectations.     Measure 3 meals per day- eat protein first, followed by soft vegetables, fruits, and whole grains- must measure food.   Include 1-2 snacks per day as needed and to help meet protein needs throughout the day.      Exercise- aim for 150 minutes moderate level exercise/week as tolerated.  Incorporate strength training 2-3 days/week.     Aim for adequate water intake > 64 oz/day.     Bariatric labs done 6/26/2021.  [place] labs done 6/25/22- Cholesterol improving, triglycerides worsening.  Vitamin D mildly low- supplement weekly x 3 months.   Continue bariatric vitamin.   Tests show no significant abnormalities: B1, CMP, ferritin, iron, thyroid, B 12, folic acid, a1c, cbc.   10/13/24- cholesterol, CMP, TSH, CBC in range.   Update additional labs at this time.     Follow up with Kayla.    Recommend bariatric vitamin.    Denies hx cardiac disease or substance abuse.    Start phentermine 1/2 tablet of 37.5 mg by mouth in the AM.  Increase to full dose daily as tolerated.     Discussed risks, benefits, rationale, and side effects of medication(s) including hypertension, palpitations, tachycardia, dizziness, constipation, dry mouth, and anxiety among others. Must avoid pregnancy during use, counseled on adequate contraception during use. Patient states understanding of all information and instructions.     Needs EKG.    RTC 2-3 months.      TRINH Gardiner

## 2025-02-21 NOTE — PATIENT INSTRUCTIONS
All in one with iron 1 capsule per day in either brand (Choice or Fusion).      If you choose to switch to the capsule you will need additional calcium- 4738-9859 mg calcium citrate daily.

## 2025-02-24 LAB — VITAMIN B1 WHOLE BLD: 108.5 NMOL/L

## 2025-02-27 ENCOUNTER — E-ADVICE (OUTPATIENT)
Dept: CARDIOLOGY | Age: 40
End: 2025-02-27

## 2025-03-03 ENCOUNTER — TELEPHONE (OUTPATIENT)
Dept: CARDIOLOGY | Age: 40
End: 2025-03-03

## 2025-03-13 ENCOUNTER — TELEPHONE (OUTPATIENT)
Dept: CARDIOLOGY | Age: 40
End: 2025-03-13

## 2025-03-17 DIAGNOSIS — E55.9 VITAMIN D DEFICIENCY: Primary | ICD-10-CM

## 2025-03-18 ENCOUNTER — ANCILLARY PROCEDURE (OUTPATIENT)
Dept: CARDIOLOGY | Age: 40
End: 2025-03-18

## 2025-04-14 ENCOUNTER — APPOINTMENT (OUTPATIENT)
Dept: CARDIOLOGY | Age: 40
End: 2025-04-14

## (undated) DEVICE — EXOFIN TISSUE ADHESIVE 1.0ML

## (undated) DEVICE — TROCAR: Brand: KII FIOS FIRST ENTRY

## (undated) DEVICE — GOWN SURG AERO BLUE PERF LG

## (undated) DEVICE — ENDOSTITCH SURGIDAC 0

## (undated) DEVICE — LAP CHOLE: Brand: MEDLINE INDUSTRIES, INC.

## (undated) DEVICE — STERILE SURGICAL LUBRICANT, METAL TUBE: Brand: SURGILUBE

## (undated) DEVICE — DRAPE SHEET LG

## (undated) DEVICE — HARMONIC ACE +7 LAPAROSCOPIC SHEARS ADVANCED HEMOSTASIS 5MM DIAMETER 45CM SHAFT LENGTH  FOR USE WITH GRAY HAND PIECE ONLY: Brand: HARMONIC ACE

## (undated) DEVICE — [HIGH FLOW INSUFFLATOR,  DO NOT USE IF PACKAGE IS DAMAGED,  KEEP DRY,  KEEP AWAY FROM SUNLIGHT,  PROTECT FROM HEAT AND RADIOACTIVE SOURCES.]: Brand: PNEUMOSURE

## (undated) DEVICE — ENDOPATH ECHELON ENDOSCOPIC LINEAR CUTTER RELOADS, WHITE, 60MM: Brand: ECHELON ENDOPATH

## (undated) DEVICE — SEAM GUARD 60 ECHELON

## (undated) DEVICE — SUTURE PDS II 1 CT-1

## (undated) DEVICE — Device: Brand: JELCO

## (undated) DEVICE — HOVERMATT 34IN SINGLE USE

## (undated) DEVICE — SOL  .9 1000ML BTL

## (undated) DEVICE — PROXIMATE SKIN STAPLERS (35 WIDE) CONTAINS 35 STAINLESS STEEL STAPLES (FIXED HEAD): Brand: PROXIMATE

## (undated) DEVICE — VIOLET BRAIDED (POLYGLACTIN 910), SYNTHETIC ABSORBABLE SUTURE: Brand: COATED VICRYL

## (undated) DEVICE — MEDI-VAC NON-CONDUCTIVE SUCTION TUBING: Brand: CARDINAL HEALTH

## (undated) DEVICE — ENDOSCOPY PORT CONNECTOR FOR OLYMPUS® SCOPES: Brand: ERBE

## (undated) DEVICE — 3M™ STERI-DRAPE™ INSTRUMENT POUCH 1018L: Brand: STERI-DRAPE™

## (undated) DEVICE — TROCARS: Brand: KII® BALLOON BLUNT TIP SYSTEM

## (undated) DEVICE — ENDOPATH ECHELON ENDOSCOPIC LINEAR CUTTER RELOADS, BLUE, 60MM: Brand: ECHELON ENDOPATH

## (undated) DEVICE — ENDOPATH ECHELON ENDOSCOPIC LINEAR CUTTER RELOADS, GREEN, 60MM: Brand: ECHELON ENDOPATH

## (undated) DEVICE — ENCORE® LATEX MICRO SIZE 7.5, STERILE LATEX POWDER-FREE SURGICAL GLOVE: Brand: ENCORE

## (undated) DEVICE — SUTURE ENDOSTITCH 2-0 VIO 48\"

## (undated) DEVICE — SUTURING DEVICE: Brand: ENDO STITCH

## (undated) DEVICE — SOL  .9 1000ML BAG

## (undated) DEVICE — ENCORE® LATEX MICRO SIZE 7, STERILE LATEX POWDER-FREE SURGICAL GLOVE: Brand: ENCORE

## (undated) DEVICE — UNDYED BRAIDED (POLYGLACTIN 910), SYNTHETIC ABSORBABLE SUTURE: Brand: COATED VICRYL

## (undated) DEVICE — ECHELON FLEX POWERED PLUS LONG ARTICULATING ENDOSCOPIC LINEAR CUTTER, 60MM: Brand: ECHELON FLEX

## (undated) DEVICE — WOUND RETRACTOR AND PROTECTOR: Brand: ALEXIS WOUND PROTECTOR-RETRACTOR

## (undated) DEVICE — STAPLER EEA XL 25MM

## (undated) DEVICE — HYBRID TUBING/CAP SET FOR OLYMPUS® SCOPES: Brand: ERBE

## (undated) NOTE — MR AVS SNAPSHOT
[address]  [phone]             Thank you for choosing us for your health care visit with Magnus Paredes MD.  We are glad to serve you and happy to provide you with this summary of your v hours. Commonly known as: TYLENOL        amoxicillin clavulanate 875-125 MG Tabs  Take 1 tablet by mouth 2 (two) times daily for 10 days.   Commonly known as: AUGMENTIN        * oxyCODONE 5 MG Tabs  Take 1 tablet (5 mg total) by mouth every 6 (six) hours a [facility] online at  [email or web address]

## (undated) NOTE — Clinical Note
She has PPO  Interested in surgery. Can you please check benefits to verify is she has coverage for the sleeve.      Thanks   Dr Daniel Bone

## (undated) NOTE — LETTER
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  She has obstructive s